# Patient Record
Sex: FEMALE | Race: WHITE | NOT HISPANIC OR LATINO | Employment: OTHER | URBAN - METROPOLITAN AREA
[De-identification: names, ages, dates, MRNs, and addresses within clinical notes are randomized per-mention and may not be internally consistent; named-entity substitution may affect disease eponyms.]

---

## 2017-01-09 ENCOUNTER — ALLSCRIPTS OFFICE VISIT (OUTPATIENT)
Dept: OTHER | Facility: OTHER | Age: 67
End: 2017-01-09

## 2017-01-09 ENCOUNTER — GENERIC CONVERSION - ENCOUNTER (OUTPATIENT)
Dept: OTHER | Facility: OTHER | Age: 67
End: 2017-01-09

## 2017-01-09 DIAGNOSIS — R10.12 LEFT UPPER QUADRANT PAIN: ICD-10-CM

## 2017-03-29 ENCOUNTER — ALLSCRIPTS OFFICE VISIT (OUTPATIENT)
Dept: OTHER | Facility: OTHER | Age: 67
End: 2017-03-29

## 2017-04-24 ENCOUNTER — GENERIC CONVERSION - ENCOUNTER (OUTPATIENT)
Dept: OTHER | Facility: OTHER | Age: 67
End: 2017-04-24

## 2017-04-26 DIAGNOSIS — Z00.00 ENCOUNTER FOR GENERAL ADULT MEDICAL EXAMINATION WITHOUT ABNORMAL FINDINGS: ICD-10-CM

## 2017-04-27 ENCOUNTER — GENERIC CONVERSION - ENCOUNTER (OUTPATIENT)
Dept: OTHER | Facility: OTHER | Age: 67
End: 2017-04-27

## 2017-05-15 ENCOUNTER — ALLSCRIPTS OFFICE VISIT (OUTPATIENT)
Dept: OTHER | Facility: OTHER | Age: 67
End: 2017-05-15

## 2017-08-22 ENCOUNTER — ALLSCRIPTS OFFICE VISIT (OUTPATIENT)
Dept: OTHER | Facility: OTHER | Age: 67
End: 2017-08-22

## 2017-08-22 DIAGNOSIS — I10 ESSENTIAL (PRIMARY) HYPERTENSION: ICD-10-CM

## 2017-08-22 DIAGNOSIS — K21.9 GASTRO-ESOPHAGEAL REFLUX DISEASE WITHOUT ESOPHAGITIS: ICD-10-CM

## 2017-08-22 DIAGNOSIS — Z00.00 ENCOUNTER FOR GENERAL ADULT MEDICAL EXAMINATION WITHOUT ABNORMAL FINDINGS: ICD-10-CM

## 2017-08-22 DIAGNOSIS — E78.5 HYPERLIPIDEMIA: ICD-10-CM

## 2017-08-22 DIAGNOSIS — E66.9 OBESITY: ICD-10-CM

## 2017-08-23 ENCOUNTER — GENERIC CONVERSION - ENCOUNTER (OUTPATIENT)
Dept: OTHER | Facility: OTHER | Age: 67
End: 2017-08-23

## 2017-09-15 ENCOUNTER — GENERIC CONVERSION - ENCOUNTER (OUTPATIENT)
Dept: OTHER | Facility: OTHER | Age: 67
End: 2017-09-15

## 2017-09-27 ENCOUNTER — TRANSCRIBE ORDERS (OUTPATIENT)
Dept: SLEEP CENTER | Facility: CLINIC | Age: 67
End: 2017-09-27

## 2017-09-27 DIAGNOSIS — G47.33 OSA (OBSTRUCTIVE SLEEP APNEA): Primary | ICD-10-CM

## 2017-10-27 ENCOUNTER — GENERIC CONVERSION - ENCOUNTER (OUTPATIENT)
Dept: OTHER | Facility: OTHER | Age: 67
End: 2017-10-27

## 2017-11-29 ENCOUNTER — GENERIC CONVERSION - ENCOUNTER (OUTPATIENT)
Dept: OTHER | Facility: OTHER | Age: 67
End: 2017-11-29

## 2017-12-22 ENCOUNTER — ALLSCRIPTS OFFICE VISIT (OUTPATIENT)
Dept: OTHER | Facility: OTHER | Age: 67
End: 2017-12-22

## 2017-12-22 ENCOUNTER — APPOINTMENT (OUTPATIENT)
Dept: LAB | Facility: CLINIC | Age: 67
End: 2017-12-22
Payer: COMMERCIAL

## 2017-12-22 DIAGNOSIS — F32.9 MAJOR DEPRESSIVE DISORDER, SINGLE EPISODE: ICD-10-CM

## 2017-12-22 DIAGNOSIS — I10 ESSENTIAL (PRIMARY) HYPERTENSION: ICD-10-CM

## 2017-12-22 DIAGNOSIS — E78.5 HYPERLIPIDEMIA: ICD-10-CM

## 2017-12-22 DIAGNOSIS — E66.01 MORBID (SEVERE) OBESITY DUE TO EXCESS CALORIES (HCC): ICD-10-CM

## 2017-12-22 DIAGNOSIS — C73 MALIGNANT NEOPLASM OF THYROID GLAND (HCC): ICD-10-CM

## 2017-12-22 DIAGNOSIS — J45.909 UNCOMPLICATED ASTHMA: ICD-10-CM

## 2017-12-22 LAB
ALBUMIN SERPL BCP-MCNC: 3.6 G/DL (ref 3.5–5)
ALP SERPL-CCNC: 94 U/L (ref 46–116)
ALT SERPL W P-5'-P-CCNC: 20 U/L (ref 12–78)
ANION GAP SERPL CALCULATED.3IONS-SCNC: 3 MMOL/L (ref 4–13)
AST SERPL W P-5'-P-CCNC: 12 U/L (ref 5–45)
BILIRUB SERPL-MCNC: 1.15 MG/DL (ref 0.2–1)
BUN SERPL-MCNC: 13 MG/DL (ref 5–25)
CALCIUM SERPL-MCNC: 9.3 MG/DL (ref 8.3–10.1)
CHLORIDE SERPL-SCNC: 103 MMOL/L (ref 100–108)
CHOLEST SERPL-MCNC: 180 MG/DL (ref 50–200)
CO2 SERPL-SCNC: 31 MMOL/L (ref 21–32)
CREAT SERPL-MCNC: 0.74 MG/DL (ref 0.6–1.3)
ERYTHROCYTE [DISTWIDTH] IN BLOOD BY AUTOMATED COUNT: 13.5 % (ref 11.6–15.1)
GFR SERPL CREATININE-BSD FRML MDRD: 84 ML/MIN/1.73SQ M
GLUCOSE P FAST SERPL-MCNC: 123 MG/DL (ref 65–99)
HCT VFR BLD AUTO: 42.4 % (ref 34.8–46.1)
HDLC SERPL-MCNC: 61 MG/DL (ref 40–60)
HGB BLD-MCNC: 13.9 G/DL (ref 11.5–15.4)
LDLC SERPL CALC-MCNC: 90 MG/DL (ref 0–100)
MCH RBC QN AUTO: 27.4 PG (ref 26.8–34.3)
MCHC RBC AUTO-ENTMCNC: 32.8 G/DL (ref 31.4–37.4)
MCV RBC AUTO: 84 FL (ref 82–98)
PLATELET # BLD AUTO: 329 THOUSANDS/UL (ref 149–390)
PMV BLD AUTO: 10.5 FL (ref 8.9–12.7)
POTASSIUM SERPL-SCNC: 4.3 MMOL/L (ref 3.5–5.3)
PROT SERPL-MCNC: 7.2 G/DL (ref 6.4–8.2)
RBC # BLD AUTO: 5.07 MILLION/UL (ref 3.81–5.12)
SODIUM SERPL-SCNC: 137 MMOL/L (ref 136–145)
T4 FREE SERPL-MCNC: 1.59 NG/DL (ref 0.76–1.46)
TRIGL SERPL-MCNC: 146 MG/DL
TSH SERPL DL<=0.05 MIU/L-ACNC: 0.17 UIU/ML (ref 0.36–3.74)
WBC # BLD AUTO: 6.74 THOUSAND/UL (ref 4.31–10.16)

## 2017-12-22 PROCEDURE — 84439 ASSAY OF FREE THYROXINE: CPT

## 2017-12-22 PROCEDURE — 85027 COMPLETE CBC AUTOMATED: CPT

## 2017-12-22 PROCEDURE — 36415 COLL VENOUS BLD VENIPUNCTURE: CPT

## 2017-12-22 PROCEDURE — 80061 LIPID PANEL: CPT

## 2017-12-22 PROCEDURE — 80053 COMPREHEN METABOLIC PANEL: CPT

## 2017-12-22 PROCEDURE — 84443 ASSAY THYROID STIM HORMONE: CPT

## 2017-12-23 NOTE — PROGRESS NOTES
Assessment   1  Tobacco non-user (V49 89) (Z78 9)   2  History of No advance directive on file (V49 89) (Z78 9)   3  Asthma (493 90) (J45 909)   4  Depression (311) (F32 9)   5  Hypertension (401 9) (I10)   6  Hyperlipidemia (272 4) (E78 5)   7  Obesity, morbid (278 01) (E66 01)   8  Thyroid cancer (193) (C73)    Plan   Asthma, Depression, Hyperlipidemia, Hypertension, Obesity, morbid, Thyroid cancer    · (1) CBC/ PLT (NO DIFF); Status:Active; Requested for:18Npu5520;    · (1) COMPREHENSIVE METABOLIC PANEL; Status:Active; Requested for:94Jpn4023;    · (1) LIPID PANEL, FASTING; Status:Active; Requested for:12Aey9394;    · (1) T4, FREE; Status:Active; Requested for:26Fou5303;    · (1) TSH; Status:Active; Requested for:78Wfb6027;    · Follow-up visit in 4 Months Evaluation and Treatment  Follow-up  Status: Hold For - Scheduling     Requested for: 30Bkt6560    Discussion/Summary   Discussion Summary:    Regarding her weight she is going to continue to try and lose weight  will get a flu shot today  will check all of her labs today which she was post to do  asthma is under control  blood pressure is stable at the present time  will see her back in 6 months  She is otherwise up-to-date on health maintenance issues  Chief Complaint   Chief Complaint Free Text Note Form: Problems are 1  Asthma 2  Hypothyroid 3  Morbid obesity 4  Hypertension 5  Hyperlipidemia 6  Obstructive sleep apnea  History of Present Illness   HPI: She comes in for follow-up  She has gained 2 lb  She is feels fairly well but does have a lot of stress taking care of her   not up-to-date on eye checkups  Otherwise up-to-date on health maintenance issues  Review of Systems   Complete-Female:      Constitutional: No fever, no chills, feels well, no tiredness, no recent weight gain or weight loss  Eyes: No complaints of eye pain, no red eyes, no eyesight problems, no discharge, no dry eyes, no itching of eyes  ENT: no complaints of earache, no loss of hearing, no nose bleeds, no nasal discharge, no sore throat, no hoarseness  Cardiovascular: No complaints of slow heart rate, no fast heart rate, no chest pain, no palpitations, no leg claudication, no lower extremity edema  Respiratory: No complaints of shortness of breath, no wheezing, no cough, no SOB on exertion, no orthopnea, no PND  Gastrointestinal: Is morbidly obese , but-- No complaints of abdominal pain, no constipation, no nausea or vomiting, no diarrhea, no bloody stools  Genitourinary: No complaints of dysuria, no incontinence, no pelvic pain, no dysmenorrhea, no vaginal discharge or bleeding  Musculoskeletal: No complaints of arthralgias, no myalgias, no joint swelling or stiffness, no limb pain or swelling  Integumentary: No complaints of skin rash or lesions, no itching, no skin wounds, no breast pain or lump  Neurological: No complaints of headache, no confusion, no convulsions, no numbness, no dizziness or fainting, no tingling, no limb weakness, no difficulty walking  Psychiatric: Not suicidal, no sleep disturbance, no anxiety or depression, no change in personality, no emotional problems  Endocrine: No complaints of proptosis, no hot flashes, no muscle weakness, no deepening of the voice, no feelings of weakness  Hematologic/Lymphatic: No complaints of swollen glands, no swollen glands in the neck, does not bleed easily, does not bruise easily  Active Problems   1  Abdominal pain, acute, left upper quadrant (789 02,338 19) (R10 12)   2  Acute bacterial conjunctivitis of both eyes (372 03) (H10 33)   3  Allergic rhinitis (477 9) (J30 9)   4  Asthma (493 90) (J45 909)   5  Change in stool habits (787 99) (R19 4)   6  Chest pain (786 50) (R07 9)   7  Dental infection (522 4) (K04 7)   8  Depression (311) (F32 9)   9  GERD without esophagitis (530 81) (K21 9)   10   History of colon polyps (V12 72) (Z86 010)   11  Hyperlipidemia (272 4) (E78 5)   12  Hypertension (401 9) (I10)   13  History of No advance directive on file (V49 89) (Z78 9)   14  Obesity (278 00) (E66 9)   15  Obesity, morbid (278 01) (E66 01)   16  HEIDI on CPAP (327 23,V46 8) (G47 33,Z99 89)   17  Postnasal drip (784 91) (R09 82)   18  Postoperative hypothyroidism (244 0) (E89 0)   19  Rib pain on left side (786 50) (R07 81)   20  Screening for genitourinary condition (V81 6) (Z13 89)   21  Screening for neurological condition (V80 09) (Z13 89)   22  Thyroid cancer (193) (C73)    Past Medical History   1  History of Carpal tunnel syndrome, unspecified laterality (354 0) (G56 00)   2  History of Closed Bone Fracture (829 0)   3  History of Deviated septum (470) (J34 2)   4  History of Disc degeneration, lumbar (722 52) (M51 36)   5  History of Encounter for screening mammogram for malignant neoplasm of breast (V76 12) (Z12 31)   6  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)   7  History of hepatitis (V12 09) (Z86 19)   8  History of hypertension (V12 59) (Z86 79)   9  History of hypothyroidism (V12 29) (Z86 39)   10  History of Malignant Thyroid Neoplasm (V10 87)   11  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)   12  History of Obstructive sleep apnea (327 23) (G47 33)  Active Problems And Past Medical History Reviewed: The active problems and past medical history were reviewed and updated today  Surgical History   1  History of Excision Of Baker's Cyst   2  History of Thyroid Surgery   3  History of Tonsillectomy  Surgical History Reviewed: The surgical history was reviewed and updated today  Family History   Mother    1  Family history of    2  Family history of Myocardial Infarction Arrhythmias  Father    3  Family history of Alcoholism, chronic   4  Family history of   Family History    5  Family history of hepatic cirrhosis (V18 59) (Z83 79)  Family History Reviewed:     The family history was reviewed and updated today  Social History    · Denied: History of Alcohol Use (History)   · Denied: History of Drug Use   · Lives with spouse   · Never A Smoker   · History of No advance directive on file (V49 89) (Z78 9)   · Occupation: Retired   · Tobacco non-user (V49 89) (Z78 9)  Social History Reviewed: The social history was reviewed and updated today  The social history was reviewed and is unchanged  Current Meds    1  Dulera 200-5 MCG/ACT Inhalation Aerosol; INHALE 2 PUFFS BY MOUTH TWO TIMES DAILY; Therapy: 22Apr2015 to (Evaluate:64Pci7059)  Requested for: 14Gyi1044; Last Rx:11Yay2470     Ordered   2  Escitalopram Oxalate 20 MG Oral Tablet; take 1 tablet by mouth every day; Therapy: 07TBO0762 to (David Rudd)  Requested for: 83BNA7091; Last Rx:02Jun2017     Ordered   3  Fluticasone Propionate 50 MCG/ACT Nasal Suspension; USE 1 SPRAY IN EACH NOSTRIL TWICE     DAILY; Therapy: 56IKH5376 to (Evaluate:18Nov2016)  Requested for: 29Jwy4321; Last Rx:59Gox4185     Ordered   4  Levothyroxine Sodium 150 MCG Oral Tablet; TAKE 1 TABLET DAILY  Requested for: 97Fok3127;     Last Rx:56Usv1972 Ordered   5  Metoprolol Succinate ER 50 MG Oral Tablet Extended Release 24 Hour; TAKE 1 TABLET DAILY; Therapy: 61Npi7385 to (Evaluate:89Tcw4199)  Requested for: 04Opv4141; Last Rx:88Xum1182     Ordered   6  Multivitamins Oral Capsule; Therapy: 40OXV7578 to Recorded   7  Pantoprazole Sodium 40 MG Oral Tablet Delayed Release; take 1 tablet every day; Therapy: 43AIC6455 to (Evaluate:16Jun2018)  Requested for: 17XPU5828; Last Rx:37Tdi3512     Ordered   8  ProAir  (90 Base) MCG/ACT Inhalation Aerosol Solution; INHALE 2 PUFFS BY MOUTH     EVERY 6 HOURS AS NEEED; Therapy: 79HZY2671 to (Evaluate:14Ysv0704)  Requested for: 13Ind3532; Last Rx:02Jun2015     Ordered   9  Simvastatin 20 MG Oral Tablet; take 1 tablet every day;      Therapy: 58YCQ3035 to (Evaluate:02Zsq1944)  Requested for: 03TYU5912; Last Rx: 62ODQ6321     Ordered   10  Tobramycin 0 3 % Ophthalmic Solution; INSTILL 1 DROP INTO AFFECTED EYE(S) 4 TIMES DAILY; Therapy: 44UXS4473 to (Evaluate:18May2017)  Requested for: 26BMT8076; Last Rx:08Xvo0683      Ordered   11  Valsartan 320 MG Oral Tablet; take 1 tablet every day; Therapy: 82ELN5983 to (Tigre Bolus)  Requested for: 49ZNH4238; Last Rx:02Jun2017      Ordered   12  Ventolin  (90 Base) MCG/ACT Inhalation Aerosol Solution; INHALE 2 PUFFS EVERY 6 HOURS      AS NEEDED; Therapy: 79Lyx2359 to (Evaluate:11Jan2018)  Requested for: 22Nov2017; Last Rx:22Nov2017      Ordered  Medication List Reviewed: The medication list was reviewed and updated today  Allergies   1  Diuretic TABS    Vitals   Vital Signs    Recorded: 28Jqu4490 10:59AM   Heart Rate 69   Systolic 021   Diastolic 82   Height 5 ft 7 in   Weight 285 lb    BMI Calculated 44 64   BSA Calculated 2 35   O2 Saturation 97     Physical Exam        Constitutional      General appearance: Abnormal  -- Morbidly obese female in no acute distress  Blood pressure is 120/80  Heart rhythm is regular  O2 saturation is 97  Pulse is 70  Eyes      Conjunctiva and lids: No swelling, erythema or discharge  Pupils and irises: Equal, round and reactive to light  Ears, Nose, Mouth, and Throat      External inspection of ears and nose: Normal        Otoscopic examination: Tympanic membranes translucent with normal light reflex  Canals patent without erythema  Nasal mucosa, septum, and turbinates: Normal without edema or erythema  Oropharynx: Normal with no erythema, edema, exudate or lesions  Pulmonary      Respiratory effort: No increased work of breathing or signs of respiratory distress  Auscultation of lungs: Abnormal  -- Breath sounds somewhat diminished  Cardiovascular      Palpation of heart: Normal PMI, no thrills  Auscultation of heart: Abnormal  -- Heart tones distant  Examination of extremities for edema and/or varicosities: Normal        Carotid pulses: Normal        Abdomen      Abdomen: Abnormal  -- Abdomen is morbidly obese  Liver and spleen: No hepatomegaly or splenomegaly  Lymphatic      Palpation of lymph nodes in neck: No lymphadenopathy  Musculoskeletal      Gait and station: Normal        Digits and nails: Normal without clubbing or cyanosis  Inspection/palpation of joints, bones, and muscles: Normal        Skin      Skin and subcutaneous tissue: Normal without rashes or lesions  Neurologic      Cranial nerves: Cranial nerves 2-12 intact  Reflexes: 2+ and symmetric  Sensation: No sensory loss  Psychiatric      Orientation to person, place, and time: Normal        Mood and affect: Normal           Results/Data   Falls Risk Assessment (Dx Z13 89 Screen for Neurologic Disorder) 39YCU7582 11:09AM User, s      Test Name Result Flag Reference   Falls Risk      No falls in the past year        Health Management   History of colon polyps   COLONOSCOPY; every 5 years; Last 45Obk7155; Next Due: 06Amk7544; Active  Health Maintenance   Pneumo; every 5 years; Last 78SRE1194; Next Due: S9914385; Overdue    Future Appointments      Date/Time Provider Specialty Site   03/15/2018 10:00 AM ISHAN Michael   Pulmonary Medicine [facility]   03/23/2018 11:00 AM Nader Benito DO Internal Medicine [facility]     Signatures    Electronically signed by : Ayleen Sheppard DO; Dec 22 2017  1:14PM EST                       (Author)

## 2017-12-27 ENCOUNTER — GENERIC CONVERSION - ENCOUNTER (OUTPATIENT)
Dept: OTHER | Facility: OTHER | Age: 67
End: 2017-12-27

## 2018-01-10 NOTE — RESULT NOTES
Verified Results  (1) CBC/PLT/DIFF 78TJU8771 01:46PM Ismael Greer    Order Number: [id number]     Test Name Result Flag Reference   WBC COUNT 5 46 Thousand/uL  4 31-10 16   RBC COUNT 4 83 Million/uL  3 81-5 12   HEMOGLOBIN 13 5 g/dL  11 5-15 4   HEMATOCRIT 40 9 %  34 8-46  1   MCV 85 fL  82-98   MCH 28 0 pg  26 8-34 3   MCHC 33 0 g/dL  31 4-37 4   RDW 13 4 %  11 6-15 1   MPV 10 1 fL  8 9-12 7   PLATELET COUNT 261 Thousands/uL  149-390   nRBC AUTOMATED 0 /100 WBCs     NEUTROPHILS RELATIVE PERCENT 44 %  43-75   LYMPHOCYTES RELATIVE PERCENT 31 %  14-44   MONOCYTES RELATIVE PERCENT 17 % H 4-12   EOSINOPHILS RELATIVE PERCENT 7 % H 0-6   BASOPHILS RELATIVE PERCENT 1 %  0-1   NEUTROPHILS ABSOLUTE COUNT 2 42 Thousands/?L  1 85-7 62   LYMPHOCYTES ABSOLUTE COUNT 1 68 Thousands/?L  0 60-4 47   MONOCYTES ABSOLUTE COUNT 0 95 Thousand/?L  0 17-1 22   EOSINOPHILS ABSOLUTE COUNT 0 37 Thousand/?L  0 00-0 61   BASOPHILS ABSOLUTE COUNT 0 03 Thousands/?L  0 00-0 10   - Patient Instructions: This bloodwork is non-fasting  Please drink two glasses of water morning of bloodwork  - Patient Instructions: This bloodwork is non-fasting  Please drink two glasses of water morning of bloodwork  (1) COMPREHENSIVE METABOLIC PANEL 91CMK2116 64:15UB Ismael Greer    Order Number: [id number]     Test Name Result Flag Reference   GLUCOSE,RANDM 108 mg/dL     If the patient is fasting, the ADA then defines impaired fasting glucose as > 100 mg/dL and diabetes as > or equal to 123 mg/dL     SODIUM 140 mmol/L  136-145   POTASSIUM 3 8 mmol/L  3 5-5 3   CHLORIDE 103 mmol/L  100-108   CARBON DIOXIDE 31 mmol/L  21-32   ANION GAP (CALC) 6 mmol/L  4-13   BLOOD UREA NITROGEN 12 mg/dL  5-25   CREATININE 0 69 mg/dL  0 60-1 30   Standardized to IDMS reference method   CALCIUM 9 0 mg/dL  8 3-10 1   BILI, TOTAL 0 56 mg/dL  0 20-1 00   ALK PHOSPHATAS 93 U/L     ALT (SGPT) 27 U/L  12-78   AST(SGOT) 15 U/L  5-45 ALBUMIN 3 5 g/dL  3 5-5 0   TOTAL PROTEIN 6 8 g/dL  6 4-8 2   eGFR Non-African American      >60 0 ml/min/1 73sq [facility] Disease Education Program recommendations are as follows:  GFR calculation is accurate only with a steady state creatinine  Chronic Kidney disease less than 60 ml/min/1 73 sq  meters  Kidney failure less than 15 ml/min/1 73 sq  meters       (1) AMYLASE 88NGW9959 01:46PM Kasia RIVAS Order Number: [id number]     Test Name Result Flag Reference   AMYLASE 14 IU/L L      (1) LIPASE 16YIK3888 01:46PM Valente Eubanks Order Number: [id number]     Test Name Result Flag Reference   LIPASE 82 u/L

## 2018-01-12 VITALS
DIASTOLIC BLOOD PRESSURE: 90 MMHG | SYSTOLIC BLOOD PRESSURE: 140 MMHG | HEART RATE: 81 BPM | HEIGHT: 67 IN | WEIGHT: 288.25 LBS | BODY MASS INDEX: 45.24 KG/M2

## 2018-01-13 VITALS
BODY MASS INDEX: 44.42 KG/M2 | WEIGHT: 283 LBS | OXYGEN SATURATION: 95 % | SYSTOLIC BLOOD PRESSURE: 136 MMHG | HEART RATE: 76 BPM | DIASTOLIC BLOOD PRESSURE: 80 MMHG | HEIGHT: 67 IN

## 2018-01-13 VITALS
BODY MASS INDEX: 44.6 KG/M2 | HEIGHT: 67 IN | HEART RATE: 77 BPM | OXYGEN SATURATION: 96 % | SYSTOLIC BLOOD PRESSURE: 136 MMHG | DIASTOLIC BLOOD PRESSURE: 86 MMHG | WEIGHT: 284.13 LBS | TEMPERATURE: 97.7 F

## 2018-01-14 VITALS
DIASTOLIC BLOOD PRESSURE: 84 MMHG | BODY MASS INDEX: 44.73 KG/M2 | SYSTOLIC BLOOD PRESSURE: 136 MMHG | HEIGHT: 67 IN | WEIGHT: 285 LBS

## 2018-01-14 NOTE — PROGRESS NOTES
Assessment    1  Tobacco non-user (V49 89) (Z78 9)   2  Hyperlipidemia (272 4) (E78 5)   3  Hypertension (401 9) (I10)   4  Obesity (278 00) (E66 9)   5  Encounter for preventive health examination (V70 0) (Z00 00)   6  GERD without esophagitis (530 81) (K21 9)    Plan  Health Maintenance    · Levothyroxine Sodium 150 MCG Oral Tablet; TAKE 1 TABLET DAILY  Health Maintenance, GERD without esophagitis, Hyperlipidemia, Hypertension, Obesity    · (1) CBC/PLT/DIFF; Status:Active; Requested for:78Rul6725;    · (1) COMPREHENSIVE METABOLIC PANEL; Status:Active; Requested for:60Sfb9406;    · (1) LIPID PANEL, FASTING; Status:Active; Requested for:34Hja5976;    · (1) T4, FREE; Status:Active; Requested for:92Cjd9076;    · (1) TSH; Status:Active; Requested for:78Frf4026;    · (1) URINALYSIS (will reflex a microscopy if leukocytes, occult blood, protein or nitrites are not within  normal limits); Status:Active; Requested for:05Vmt8067;    · Follow-up visit in 4 Months Evaluation and Treatment  Follow-up  Status: Hold For - Scheduling   Requested for: 33Rgd4833    Discussion/Summary  Discussion Summary:   Regarding her GI issues she is going to see her gastroenterologist     She is going to check all of her labs  I reviewed her medicines  I will see her back in 4 months  She will call before if any problems  Chief Complaint  Chief Complaint Free Text Note Form: Problems are #1 abdominal pain #2 hypertension #3 obesity #4 depression #5 hyperlipidemia #6 hypothyroid      History of Present Illness  HPI: She comes in for follow-up  She did have some abdominal pain  Review of Systems  Complete-Female:   Constitutional: She generally feels fairly well  She still having some GI distress and needs to see her gastroenterologist, but as noted in HPI  Eyes: No complaints of eye pain, no red eyes, no eyesight problems, no discharge, no dry eyes, no itching of eyes     ENT: no complaints of earache, no loss of hearing, no nose bleeds, no nasal discharge, no sore throat, no hoarseness  Cardiovascular: She has stable hypertension, but No complaints of slow heart rate, no fast heart rate, no chest pain, no palpitations, no leg claudication, no lower extremity edema  Respiratory: No complaints of shortness of breath, no wheezing, no cough, no SOB on exertion, no orthopnea, no PND  Gastrointestinal: She is morbidly obese, but as noted in HPI  Genitourinary: No complaints of dysuria, no incontinence, no pelvic pain, no dysmenorrhea, no vaginal discharge or bleeding  Endocrine: No complaints of proptosis, no hot flashes, no muscle weakness, no deepening of the voice, no feelings of weakness  Hematologic/Lymphatic: No complaints of swollen glands, no swollen glands in the neck, does not bleed easily, does not bruise easily  Active Problems    1  Abdominal pain, acute, left upper quadrant (789 02,338 19) (R10 12)   2  Acute bacterial conjunctivitis of both eyes (372 03) (H10 33)   3  Allergic rhinitis (477 9) (J30 9)   4  Asthma (493 90) (J45 909)   5  Change in stool habits (787 99) (R19 4)   6  Chest pain (786 50) (R07 9)   7  Dental infection (522 4) (K04 7)   8  Depression (311) (F32 9)   9  GERD without esophagitis (530 81) (K21 9)   10  History of colon polyps (V12 72) (Z86 010)   11  Hyperlipidemia (272 4) (E78 5)   12  Hypertension (401 9) (I10)   13  No advance directive on file (V49 89) (Z78 9)   14  Obesity (278 00) (E66 9)   15  HEIDI on CPAP (327 23,V46 8) (G47 33,Z99 89)   16  Postnasal drip (784 91) (R09 82)   17  Postoperative hypothyroidism (244 0) (E89 0)   18  Rib pain on left side (786 50) (R07 81)   19  Screening for genitourinary condition (V81 6) (Z13 89)   20  Screening for neurological condition (V80 09) (Z13 89)   21  Thyroid cancer (193) (C73)    Past Medical History    1  History of Carpal tunnel syndrome, unspecified laterality (354 0) (G56 00)   2  History of Closed Bone Fracture (829 0)   3   History of Deviated septum (470) (J34 2)   4  History of Disc degeneration, lumbar (722 52) (M51 36)   5  History of Encounter for screening mammogram for malignant neoplasm of breast (V76 12) (Z12 31)   6  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)   7  History of hepatitis (V12 09) (Z86 19)   8  History of hypertension (V12 59) (Z86 79)   9  History of hypothyroidism (V12 29) (Z86 39)   10  History of Malignant Thyroid Neoplasm (V10 87)   11  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)   12  History of Obstructive sleep apnea (327 23) (G47 33)  Active Problems And Past Medical History Reviewed: The active problems and past medical history were reviewed and updated today  Surgical History    1  History of Excision Of Baker's Cyst   2  History of Thyroid Surgery   3  History of Tonsillectomy  Surgical History Reviewed: The surgical history was reviewed and updated today  Family History  Mother    1  Family history of    2  Family history of Myocardial Infarction Arrhythmias  Father    3  Family history of Alcoholism, chronic   4  Family history of   Family History    5  Family history of hepatic cirrhosis (V18 59) (Z83 79)  Family History Reviewed: The family history was reviewed and updated today  Social History    · Denied: History of Alcohol Use (History)   · Denied: History of Drug Use   · Lives with spouse   · Never A Smoker   · No advance directive on file (M31 20) (Z78 9)   · Occupation: Retired   · Tobacco non-user (V49 89) (Z78 9)  Social History Reviewed: The social history was reviewed and updated today  The social history was reviewed and is unchanged  Current Meds   1  Dulera 200-5 MCG/ACT Inhalation Aerosol; INHALE 2 PUFFS Twice daily; Therapy: 2015 to (Evaluate:2017)  Requested for: 08Hqw7222; Last Rx:01Tfk8685   Ordered   2  Escitalopram Oxalate 20 MG Oral Tablet; take 1 tablet by mouth every day;    Therapy: 96PHQ5218 to (Real Damaris) Requested for: 75YJS7292; Last Rx:02Jun2017   Ordered   3  Fluticasone Propionate 50 MCG/ACT Nasal Suspension; USE 1 SPRAY IN EACH NOSTRIL TWICE   DAILY; Therapy: 70MQJ7847 to (Evaluate:18Nov2016)  Requested for: 97Xse1531; Last Rx:55Uxv5133   Ordered   4  Levothyroxine Sodium 150 MCG Oral Tablet; TAKE 1 TABLET DAILY  Requested for: 07BMU6746;   Last Rx:09Jan2017 Ordered   5  Metoprolol Succinate ER 50 MG Oral Tablet Extended Release 24 Hour; TAKE 1 TABLET DAILY; Therapy: 43Fdx8824 to (Evaluate:96Gib9036)  Requested for: 86Joa4612; Last Rx:47Msx9616   Ordered   6  Multivitamins Oral Capsule; Therapy: 79ROY3612 to Recorded   7  Pantoprazole Sodium 40 MG Oral Tablet Delayed Release; TAKE 1 TABLET DAILY; Therapy: 57EWL7198 to (Evaluate:07Nov2017)  Requested for: 09BRJ4183; Last Rx:91Qod1535   Ordered   8  ProAir  (90 Base) MCG/ACT Inhalation Aerosol Solution; INHALE 2 PUFFS BY MOUTH   EVERY 6 HOURS AS NEEED; Therapy: 05BZT5056 to (Evaluate:65Kcm7653)  Requested for: 97PEZ8863; Last II:44JRB4636   Ordered   9  Simvastatin 20 MG Oral Tablet; take 1 tablet every day; Therapy: 97WFU8219 to (Evaluate:20Apr2018)  Requested for: 67MVH7661; Last Rx:70Wam1202   Ordered   10  Tobramycin 0 3 % Ophthalmic Solution; INSTILL 1 DROP INTO AFFECTED EYE(S) 4 TIMES DAILY; Therapy: 52ITG8262 to (Evaluate:63Dge5015)  Requested for: 51PSV3618; Last Rx:31Vra0647    Ordered   11  Valsartan 320 MG Oral Tablet; take 1 tablet every day; Therapy: 99AGD6299 to ([facility]ic[facility])  Requested for: 61UFK2027; Last Rx:02Jun2017    Ordered   12  Ventolin  (90 Base) MCG/ACT Inhalation Aerosol Solution; INHALE 2 PUFFS Every 6 hours    PRN; Therapy: 77Vvz0018 to (Last Rx:20Qxf9604)  Requested for: 71Cls3887 Ordered  Medication List Reviewed: The medication list was reviewed and updated today  Allergies    1   Diuretic TABS    Vitals  Vital Signs    Recorded: 17Qyg3190 01:44PM   Heart Rate 76   Systolic 698 Diastolic 80   Height 5 ft 7 in   Weight 283 lb    BMI Calculated 44 32   BSA Calculated 2 34   O2 Saturation 95     Physical Exam    Constitutional   General appearance: Abnormal   She is obese  Her blood pressure is 130/80  Eyes   Conjunctiva and lids: No swelling, erythema or discharge  Pupils and irises: Equal, round and reactive to light  Ears, Nose, Mouth, and Throat   External inspection of ears and nose: Normal     Otoscopic examination: Tympanic membranes translucent with normal light reflex  Canals patent without erythema  Nasal mucosa, septum, and turbinates: Normal without edema or erythema  Oropharynx: Normal with no erythema, edema, exudate or lesions  Pulmonary   Respiratory effort: No increased work of breathing or signs of respiratory distress  Auscultation of lungs: Clear to auscultation  Lungs are completely clear with no rales  Cardiovascular   Auscultation of heart: Abnormal   Heart tones are distant  Examination of extremities for edema and/or varicosities: Abnormal   She has a few venous varicosities  Abdomen   Abdomen: Abnormal   Abdomen morbidly obese  Liver and spleen: No hepatomegaly or splenomegaly  Lymphatic   Palpation of lymph nodes in neck: No lymphadenopathy  Musculoskeletal   Inspection/palpation of joints, bones, and muscles: Abnormal   She has marked swelling and decreased range of motion of the left main  Skin   Skin and subcutaneous tissue: Normal without rashes or lesions  Neurologic   Cranial nerves: Cranial nerves 2-12 intact  Psychiatric   Orientation to person, place, and time: Normal     Mood and affect: Normal          Results/Data  PHQ-9 Adult Depression Screening 68Tmo1528 01:48PM [employer]     Test Name Result Flag Reference   PHQ-9 Adult Depression Score 1     Over the last two weeks, how often have you been bothered by any of the following problems?   Little interest or pleasure in doing things: Not at all - 0  Feeling down, depressed, or hopeless: Not at all - 0  Trouble falling or staying asleep, or sleeping too much: Several days - 1  Feeling tired or having little energy: Not at all - 0  Poor appetite or over eating: Not at all - 0  Feeling bad about yourself - or that you are a failure or have let yourself or your family down: Not at all - 0  Trouble concentrating on things, such as reading the newspaper or watching television: Not at all - 0  Moving or speaking so slowly that other people could have noticed  Or the opposite -  being so fidgety or restless that you have been moving around a lot more than usual: Not at all - 0  Thoughts that you would be better off dead, or of hurting yourself in some way: Not at all - 0   PHQ-9 Adult Depression Screening Negative     PHQ-9 Difficulty Level Not difficult at all     PHQ-9 Severity Minimal Depression       *VB - Urinary Incontinence Screen (Dx Z13 89 Screen for UI) 75Qpm1546 01:47PM Adrian Escamilla     Test Name Result Flag Reference   Urinary Incontinence Assessment sometimes 08/22/2017         Health Management  History of colon polyps   COLONOSCOPY; every 5 years; Last 05Gkw7202; Next Due: 80Cyu8133; Active  Health Maintenance   Pneumo; every 5 years; Last 78GOG0994; Next Due: W0101638;  Overdue    Future Appointments    Date/Time Provider Specialty Site   12/22/2017 11:00 AM Adrian Escamilla DO Internal Medicine [facility]  [address]     Signatures   Electronically signed by : Nicole Hampton DO; Aug 23 2017  8:07AM EST                       (Author)

## 2018-01-17 NOTE — RESULT NOTES
Verified Results  * XR RIBS LEFT W PA CHEST MIN 3 VIEWS 52WPK6489 02:11PM Estefanía Mathews    Order Number: [id number]     Test Name Result Flag Reference   XR RIBS LEFT W PA CHEST MIN 3 VIEWS (Report)     LEFT RIBS AND CHEST     INDICATION: Left chest pain for a month     COMPARISON: None     VIEWS: Frontal chest and 3 views left hemithorax; 4 images     FINDINGS:     The cardiomediastinal silhouette is unremarkable  Lungs are clear  No pleural effusions  There is no pneumothorax  No rib fractures are identified  Small surgical clips project near the thoracic inlet  These might be related to prior thyroid or vascular surgery  Correlate with history  IMPRESSION:     1  No active pulmonary disease  2  No evidence of rib fractures         Workstation performed: [id number]     Signed by:   Candace Ennis MD   12/1/16

## 2018-01-22 VITALS
BODY MASS INDEX: 44.18 KG/M2 | DIASTOLIC BLOOD PRESSURE: 84 MMHG | HEIGHT: 67 IN | SYSTOLIC BLOOD PRESSURE: 116 MMHG | WEIGHT: 281.5 LBS | OXYGEN SATURATION: 95 % | HEART RATE: 77 BPM

## 2018-01-23 VITALS
BODY MASS INDEX: 44.73 KG/M2 | DIASTOLIC BLOOD PRESSURE: 82 MMHG | WEIGHT: 285 LBS | HEART RATE: 69 BPM | HEIGHT: 67 IN | OXYGEN SATURATION: 97 % | SYSTOLIC BLOOD PRESSURE: 156 MMHG

## 2018-01-23 NOTE — RESULT NOTES
Verified Results  (1) CBC/ PLT (NO DIFF) 72Trr3647 11:52AM [employer] [employer] Order Number: [id number]     Test Name Result Flag Reference   HEMATOCRIT 42 4 %  34 8-46 1   HEMOGLOBIN 13 9 g/dL  11 5-15 4   MCHC 32 8 g/dL  31 4-37 4   MCH 27 4 pg  26 8-34 3   MCV 84 fL  82-98   PLATELET COUNT 371 Thousands/uL  149-390   RBC COUNT 5 07 Million/uL  3 81-5 12   RDW 13 5 %  11 6-15 1   WBC COUNT 6 74 Thousand/uL  4 31-10 16   MPV 10 5 fL  8 9-12 7     (1) COMPREHENSIVE METABOLIC PANEL 22TWX3591 64:31AP [employer] Order Number: [id number]     Test Name Result Flag Reference   SODIUM 137 mmol/L  136-145   POTASSIUM 4 3 mmol/L  3 5-5 3   CHLORIDE 103 mmol/L  100-108   CARBON DIOXIDE 31 mmol/L  21-32   ANION GAP (CALC) 3 mmol/L L 4-13   BLOOD UREA NITROGEN 13 mg/dL  5-25   CREATININE 0 74 mg/dL  0 60-1 30   Standardized to IDMS reference method   CALCIUM 9 3 mg/dL  8 3-10 1   BILI, TOTAL 1 15 mg/dL H 0 20-1 00   ALK PHOSPHATAS 94 U/L     ALT (SGPT) 20 U/L  12-78   Specimen collection should occur prior to Sulfasalazine and/or Sulfapyridine administration due to the potential for falsely depressed results  AST(SGOT) 12 U/L  5-45   Specimen collection should occur prior to Sulfasalazine administration due to the potential for falsely depressed results  ALBUMIN 3 6 g/dL  3 5-5 0   TOTAL PROTEIN 7 2 g/dL  6 4-8 2   eGFR 84 ml/min/1 73sq m     National Kidney Disease Education Program recommendations are as follows:  GFR calculation is accurate only with a steady state creatinine  Chronic Kidney disease less than 60 ml/min/1 73 sq  meters  Kidney failure less than 15 ml/min/1 73 sq  meters  GLUCOSE FASTING 123 mg/dL H 65-99   Specimen collection should occur prior to Sulfasalazine administration due to the potential for falsely depressed results  Specimen collection should occur prior to Sulfapyridine administration due to the potential for falsely elevated results       (1) LIPID PANEL, FASTING 76OOK8893 11:52AM [employer] Order Number: [id number]     Test Name Result Flag Reference   CHOLESTEROL 180 mg/dL     HDL,DIRECT 61 mg/dL H 40-60   Specimen collection should occur prior to Metamizole administration due to the potential for falsley depressed results  LDL CHOLESTEROL CALCULATED 90 mg/dL  0-100   Triglyceride:        Normal <150 mg/dl   Borderline High 150-199 mg/dl   High 200-499 mg/dl   Very High >499 mg/dl      Cholesterol:       Desirable <200 mg/dl    Borderline High 200-239 mg/dl    High >239 mg/dl      HDL Cholesterol:       High>59 mg/dL    Low <41 mg/dL      This screening LDL is a calculated result  It does not have the accuracy of the Direct Measured LDL in the monitoring of patients with hyperlipidemia and/or statin therapy  Direct Measure LDL (LXK572) must be ordered separately in these patients  TRIGLYCERIDES 146 mg/dL  <=150   Specimen collection should occur prior to N-Acetylcysteine or Metamizole administration due to the potential for falsely depressed results  (1) TSH 47Kye3475 11:52AM [employer] Order Number: [id number]     Test Name Result Flag Reference   TSH 0 170 uIU/mL L 0 358-3 740   Patients undergoing fluorescein dye angiography may retain small amounts of fluorescein in the body for 48-72 hours post procedure  Samples containing fluorescein can produce falsely depressed TSH values  If the patient had this procedure,a specimen should be resubmitted post fluorescein clearance            The recommended reference ranges for TSH during pregnancy are as follows:  First trimester 0 1 to 2 5 uIU/mL  Second trimester  0 2 to 3 0 uIU/mL  Third trimester 0 3 to 3 0 uIU/m     (1) T4, FREE 24Ehh0551 11:52AM [employer] Order Number: [id number]     Test Name Result Flag Reference   T4,FREE 1 59 ng/dL H 0 76-1 46   Specimen collection should occur prior to Sulfasalazine administration due to the potential for falsely elevated results

## 2018-01-30 DIAGNOSIS — E78.00 PURE HYPERCHOLESTEROLEMIA: Primary | ICD-10-CM

## 2018-01-30 RX ORDER — SIMVASTATIN 20 MG
TABLET ORAL
Qty: 90 TABLET | Refills: 2 | Status: SHIPPED | OUTPATIENT
Start: 2018-01-30 | End: 2018-07-01 | Stop reason: SDUPTHER

## 2018-01-31 DIAGNOSIS — E78.00 PURE HYPERCHOLESTEROLEMIA: ICD-10-CM

## 2018-01-31 RX ORDER — SIMVASTATIN 20 MG
TABLET ORAL
Qty: 90 TABLET | Refills: 2 | OUTPATIENT
Start: 2018-01-31

## 2018-02-16 DIAGNOSIS — E03.9 HYPOTHYROIDISM, UNSPECIFIED TYPE: Primary | ICD-10-CM

## 2018-02-16 RX ORDER — LEVOTHYROXINE SODIUM 0.15 MG/1
TABLET ORAL
Qty: 90 TABLET | Refills: 1 | Status: SHIPPED | OUTPATIENT
Start: 2018-02-16 | End: 2019-05-15 | Stop reason: SDUPTHER

## 2018-03-22 RX ORDER — METOPROLOL SUCCINATE 50 MG/1
1 TABLET, EXTENDED RELEASE ORAL DAILY
COMMUNITY
Start: 2015-04-29 | End: 2019-02-22 | Stop reason: SDUPTHER

## 2018-03-22 RX ORDER — CHLORAL HYDRATE 500 MG
CAPSULE ORAL
COMMUNITY
End: 2018-09-19 | Stop reason: HOSPADM

## 2018-03-22 RX ORDER — ESCITALOPRAM OXALATE 20 MG/1
1 TABLET ORAL DAILY
COMMUNITY
Start: 2014-07-30 | End: 2018-07-13 | Stop reason: SDUPTHER

## 2018-03-22 RX ORDER — RIBOFLAVIN (VITAMIN B2) 100 MG
TABLET ORAL
COMMUNITY
End: 2018-09-19 | Stop reason: HOSPADM

## 2018-03-22 RX ORDER — TOBRAMYCIN 3 MG/ML
1 SOLUTION/ DROPS OPHTHALMIC 4 TIMES DAILY
COMMUNITY
Start: 2017-05-15 | End: 2018-12-12

## 2018-03-22 RX ORDER — PANTOPRAZOLE SODIUM 40 MG/1
1 TABLET, DELAYED RELEASE ORAL DAILY
COMMUNITY
Start: 2016-11-30 | End: 2018-07-10 | Stop reason: SDUPTHER

## 2018-03-22 RX ORDER — CITALOPRAM 40 MG/1
TABLET ORAL
COMMUNITY
Start: 2006-11-20 | End: 2018-03-23 | Stop reason: ALTCHOICE

## 2018-03-22 RX ORDER — IBUPROFEN 400 MG/1
400 TABLET ORAL DAILY PRN
COMMUNITY
Start: 2015-10-08 | End: 2020-08-27 | Stop reason: CLARIF

## 2018-03-22 RX ORDER — FLUTICASONE PROPIONATE 50 MCG
1 SPRAY, SUSPENSION (ML) NASAL 2 TIMES DAILY
COMMUNITY
Start: 2015-03-20 | End: 2019-07-29 | Stop reason: SDUPTHER

## 2018-03-22 RX ORDER — LANOLIN ALCOHOL/MO/W.PET/CERES
CREAM (GRAM) TOPICAL
COMMUNITY
End: 2018-09-19 | Stop reason: HOSPADM

## 2018-03-22 RX ORDER — VALSARTAN 320 MG/1
1 TABLET ORAL DAILY
COMMUNITY
Start: 2014-10-20 | End: 2018-07-13 | Stop reason: SDUPTHER

## 2018-03-22 RX ORDER — ALBUTEROL SULFATE 90 UG/1
AEROSOL, METERED RESPIRATORY (INHALATION)
COMMUNITY
Start: 2015-03-20 | End: 2019-07-29 | Stop reason: SDUPTHER

## 2018-03-22 RX ORDER — MULTIVIT WITH MINERALS/LUTEIN
TABLET ORAL
COMMUNITY
End: 2018-09-19 | Stop reason: HOSPADM

## 2018-03-23 ENCOUNTER — OFFICE VISIT (OUTPATIENT)
Dept: INTERNAL MEDICINE CLINIC | Facility: CLINIC | Age: 68
End: 2018-03-23
Payer: COMMERCIAL

## 2018-03-23 ENCOUNTER — TELEPHONE (OUTPATIENT)
Dept: NEUROLOGY | Facility: CLINIC | Age: 68
End: 2018-03-23

## 2018-03-23 VITALS
DIASTOLIC BLOOD PRESSURE: 99 MMHG | BODY MASS INDEX: 44.92 KG/M2 | OXYGEN SATURATION: 95 % | SYSTOLIC BLOOD PRESSURE: 144 MMHG | HEART RATE: 71 BPM | HEIGHT: 67 IN | WEIGHT: 286.2 LBS

## 2018-03-23 DIAGNOSIS — J45.909 MODERATE ASTHMA WITHOUT COMPLICATION, UNSPECIFIED WHETHER PERSISTENT: ICD-10-CM

## 2018-03-23 DIAGNOSIS — F32.A DEPRESSION, UNSPECIFIED DEPRESSION TYPE: ICD-10-CM

## 2018-03-23 DIAGNOSIS — E78.2 MIXED HYPERLIPIDEMIA: ICD-10-CM

## 2018-03-23 DIAGNOSIS — I10 ESSENTIAL HYPERTENSION: ICD-10-CM

## 2018-03-23 DIAGNOSIS — R41.3 MEMORY LOSS: ICD-10-CM

## 2018-03-23 DIAGNOSIS — K21.9 GERD WITHOUT ESOPHAGITIS: Primary | ICD-10-CM

## 2018-03-23 DIAGNOSIS — E03.9 ACQUIRED HYPOTHYROIDISM: ICD-10-CM

## 2018-03-23 PROCEDURE — 3725F SCREEN DEPRESSION PERFORMED: CPT | Performed by: INTERNAL MEDICINE

## 2018-03-23 PROCEDURE — 99214 OFFICE O/P EST MOD 30 MIN: CPT | Performed by: INTERNAL MEDICINE

## 2018-03-23 RX ORDER — LEVOTHYROXINE SODIUM 175 UG/1
175 TABLET ORAL DAILY
COMMUNITY
End: 2019-03-15 | Stop reason: SDUPTHER

## 2018-03-23 NOTE — PROGRESS NOTES
Assessment/Plan:  Regarding her hypertension she is stable at the present time I will continue her current medicines  She needs to lose weight she was well aware that  She has depression which is reasonably stable  She does have a lot of responsibility taking care of her  who has terminal cancer  Regarding her decreased memory she is going to be referred to Neurology for evaluation  She will continue monitor her lab tests and I will see her back in 4 months  No problem-specific Assessment & Plan notes found for this encounter  Diagnoses and all orders for this visit:    GERD without esophagitis  -     Basic metabolic panel; Future    Moderate asthma without complication, unspecified whether persistent    Essential hypertension    Depression, unspecified depression type    Mixed hyperlipidemia    Memory loss  -     Ambulatory referral to Neurology; Future    Acquired hypothyroidism  -     TSH, 3rd generation; Future  -     T4, free; Future    Other orders  -     metoprolol succinate (TOPROL-XL) 50 mg 24 hr tablet; Take 1 tablet by mouth daily  -     pantoprazole (PROTONIX) 40 mg tablet; Take 1 tablet by mouth daily  -     Pediatric Multivitamins-Fl (MULTIVITAMINS/FL PO); Take by mouth  -     albuterol (PROAIR HFA) 90 mcg/act inhaler; Inhale  -     tobramycin (TOBREX) 0 3 % SOLN; Apply 1 drop to eye 4 (four) times a day  -     escitalopram (LEXAPRO) 20 mg tablet; Take 1 tablet by mouth daily  -     Omega-3 Fatty Acids (FISH OIL) 1,000 mg; Take by mouth  -     fluticasone (FLONASE) 50 mcg/act nasal spray; 1 spray into each nostril 2 (two) times a day  -     ibuprofen (MOTRIN) 400 mg tablet; Take 400 mg by mouth  -     Mometasone Furo-Formoterol Fum (DULERA) 200-5 MCG/ACT AERO; Inhale  -     vitamin E, tocopherol, 1,000 units capsule; Take by mouth  -     Ascorbic Acid (VITAMIN C) 100 MG tablet; Take by mouth  -     cyanocobalamin (VITAMIN B-12) 1,000 mcg tablet;  Take by mouth  -     valsartan (DIOVAN) 320 MG tablet; Take 1 tablet by mouth daily  -     Discontinue: citalopram (CELEXA) 40 mg tablet; Take by mouth  -     Hm Colonoscopy  -     levothyroxine 175 mcg tablet; Take 175 mcg by mouth daily TAKE 1 TABLET Saturday AND Sunday  Subjective:  Problems are 1  Depression 2  Hypertension 3  Hyperlipidemia 4  History of thyroid cancer 5  Obesity     Patient ID: Alexandru Vogel is a 79 y o  female  HPI she comes in for follow-up  Her only complaint that is new is that she feels her memory isn't as good as it was  Her  relates that she is forgetting things more than she used to  She is 79years old at this point  She is hypothyroid by virtue of thyroid surgery for malignancy many years ago  She has not had any cardiopulmonary complaints  The following portions of the patient's history were reviewed and updated as appropriate: allergies, current medications, past family history, past medical history, past social history, past surgical history and problem list     Review of Systems   Constitutional: Positive for fatigue  Negative for activity change, appetite change, chills, diaphoresis, fever and unexpected weight change  She remains morbidly obese  She does not follow any diet  HENT: Negative for congestion, ear pain, hearing loss, mouth sores, nosebleeds, postnasal drip, sinus pain, sinus pressure, sore throat and trouble swallowing  Eyes: Negative for pain, discharge and visual disturbance  Respiratory: Positive for shortness of breath  Negative for apnea, cough, chest tightness and wheezing  Cardiovascular: Negative for chest pain, palpitations and leg swelling  Gastrointestinal: Negative for abdominal pain, anal bleeding, blood in stool, constipation, diarrhea, nausea and vomiting  Endocrine: Negative for polydipsia and polyphagia  Genitourinary: Negative for decreased urine volume, dysuria, flank pain, frequency, hematuria and urgency  Musculoskeletal: Negative for arthralgias, back pain, gait problem, joint swelling and myalgias  Skin: Negative for rash and wound  Allergic/Immunologic: Negative for environmental allergies and food allergies  Neurological: Negative for dizziness, tremors, seizures, syncope, speech difficulty, light-headedness, numbness and headaches  She feels her memory is somewhat less good that he used to be  Hematological: Negative for adenopathy  Does not bruise/bleed easily  Psychiatric/Behavioral: Negative for agitation, confusion, hallucinations, sleep disturbance and suicidal ideas  The patient is not nervous/anxious  Objective:     Physical Exam   Constitutional: No distress  She is markedly overweight  Her blood pressure is 130/84  HENT:   Head: Normocephalic  Right Ear: External ear normal    Left Ear: External ear normal    Nose: Nose normal    Mouth/Throat: Oropharynx is clear and moist  No oropharyngeal exudate  Eyes: Conjunctivae and EOM are normal  Pupils are equal, round, and reactive to light  Right eye exhibits no discharge  Left eye exhibits no discharge  Neck: Normal range of motion  Neck supple  No thyromegaly present  Cardiovascular: Normal rate, regular rhythm, normal heart sounds and intact distal pulses  Exam reveals no gallop and no friction rub  No murmur heard  Pulmonary/Chest: Effort normal and breath sounds normal  No respiratory distress  She has no wheezes  She has no rales  Abdominal: Soft  Bowel sounds are normal  She exhibits no distension and no mass  There is no tenderness  There is no rebound and no guarding  Abdomen is markedly obese   Musculoskeletal: Normal range of motion  She exhibits edema  She exhibits no tenderness or deformity  She has a trace of peripheral edema  Lymphadenopathy:     She has no cervical adenopathy  Neurological: She is alert  She has normal reflexes  No cranial nerve deficit   Coordination normal    Skin: Skin is warm and dry  No rash noted  No erythema  Psychiatric: She has a normal mood and affect  Her behavior is normal  Judgment and thought content normal    Nursing note and vitals reviewed

## 2018-05-07 ENCOUNTER — TELEPHONE (OUTPATIENT)
Dept: NEUROLOGY | Facility: CLINIC | Age: 68
End: 2018-05-07

## 2018-05-08 ENCOUNTER — TELEPHONE (OUTPATIENT)
Dept: NEUROLOGY | Facility: CLINIC | Age: 68
End: 2018-05-08

## 2018-07-01 DIAGNOSIS — E78.00 PURE HYPERCHOLESTEROLEMIA: ICD-10-CM

## 2018-07-02 RX ORDER — SIMVASTATIN 20 MG
TABLET ORAL
Qty: 90 TABLET | Refills: 2 | Status: SHIPPED | OUTPATIENT
Start: 2018-07-02 | End: 2019-04-25 | Stop reason: SDUPTHER

## 2018-07-10 DIAGNOSIS — K21.9 GASTROESOPHAGEAL REFLUX DISEASE WITHOUT ESOPHAGITIS: Primary | ICD-10-CM

## 2018-07-10 RX ORDER — PANTOPRAZOLE SODIUM 40 MG/1
TABLET, DELAYED RELEASE ORAL
Qty: 30 TABLET | Refills: 5 | Status: SHIPPED | OUTPATIENT
Start: 2018-07-10 | End: 2019-02-08 | Stop reason: SDUPTHER

## 2018-07-13 DIAGNOSIS — I10 ESSENTIAL HYPERTENSION: ICD-10-CM

## 2018-07-13 DIAGNOSIS — F32.A DEPRESSION, UNSPECIFIED DEPRESSION TYPE: Primary | ICD-10-CM

## 2018-07-13 RX ORDER — VALSARTAN 320 MG/1
TABLET ORAL
Qty: 90 TABLET | Refills: 1 | Status: SHIPPED | OUTPATIENT
Start: 2018-07-13 | End: 2019-02-22 | Stop reason: SDUPTHER

## 2018-07-13 RX ORDER — ESCITALOPRAM OXALATE 20 MG/1
TABLET ORAL
Qty: 90 TABLET | Refills: 1 | Status: SHIPPED | OUTPATIENT
Start: 2018-07-13 | End: 2019-02-22 | Stop reason: SDUPTHER

## 2018-07-25 ENCOUNTER — TELEPHONE (OUTPATIENT)
Dept: NEUROLOGY | Facility: CLINIC | Age: 68
End: 2018-07-25

## 2018-09-17 DIAGNOSIS — J41.0 SIMPLE CHRONIC BRONCHITIS (HCC): Primary | ICD-10-CM

## 2018-09-17 RX ORDER — MOMETASONE FUROATE AND FORMOTEROL FUMARATE DIHYDRATE 200; 5 UG/1; UG/1
AEROSOL RESPIRATORY (INHALATION)
Qty: 39 INHALER | Refills: 3 | Status: SHIPPED | OUTPATIENT
Start: 2018-09-17 | End: 2019-07-29 | Stop reason: SDUPTHER

## 2018-09-19 ENCOUNTER — OFFICE VISIT (OUTPATIENT)
Dept: NEUROLOGY | Facility: CLINIC | Age: 68
End: 2018-09-19
Payer: COMMERCIAL

## 2018-09-19 VITALS
HEIGHT: 68 IN | BODY MASS INDEX: 43.35 KG/M2 | SYSTOLIC BLOOD PRESSURE: 148 MMHG | WEIGHT: 286 LBS | DIASTOLIC BLOOD PRESSURE: 94 MMHG | HEART RATE: 71 BPM

## 2018-09-19 DIAGNOSIS — R41.3 MEMORY DIFFICULTY: Primary | ICD-10-CM

## 2018-09-19 DIAGNOSIS — R20.2 NUMBNESS AND TINGLING: ICD-10-CM

## 2018-09-19 DIAGNOSIS — R20.0 NUMBNESS AND TINGLING: ICD-10-CM

## 2018-09-19 DIAGNOSIS — R26.89 BALANCE PROBLEM: ICD-10-CM

## 2018-09-19 PROCEDURE — 99204 OFFICE O/P NEW MOD 45 MIN: CPT | Performed by: PSYCHIATRY & NEUROLOGY

## 2018-09-19 NOTE — PROGRESS NOTES
Azeem Simmons is a 76 y o  female  Chief Complaint   Patient presents with    Memory Loss    Balance Problem       Assessment:  1  Memory difficulty    2  Balance problem    3  Numbness and tingling        Plan:    Discussion:  Differential diagnosis of her memory difficulty discussed with the patient, patient's [place] is 27/30 and she has a lot of stress in her life including taking care of her  who is having terminal cancer and that could be contributing to some of her cognitive issues but other than a true memory issue but it is in the differential diagnosis, if she continues to have the symptoms in the future may benefit from a detailed neuropsych testing, the also differential diagnosis of her balance disorder discussed with the patient, would recommend an MRI scan of the brain and C-spine and blood work to evaluate for her symptoms, would recommend EMG nerve conduction study of bilateral upper extremity to evaluate for carpal tunnel syndrome, patient to call me after the above test to discuss the results, she was advised to take fall and safety precautions, also was advised mentally stimulating exercises, she is not keen to go for physical therapy, she was encouraged to continue using the cane, to go to the hospital if has any worsening symptoms and call me otherwise to see me back in 2 months and follow up with her other physicians      Subjective:    HPI   Patient is here for evaluation of memory issues including slight difficulty in attention and concentration, balance issues and numbness and tingling in the hands, according to the patient she does not have memory issues, she has been having slight difficulty with her memory due to being under stress, and being sleep deprived since her  is not doing good and has terminal cancer, she also has been having balance issues especially while walking on uneven surfaces, she has not had any falls, denies any bowel or bladder incontinence, she does complain of some occasional neck pain, she also has numbness and tingling in both hands especially the right worse than the left course in the morning after she wakes up and has to shake her hands and sometimes she would have things fall out of her hand, no other complaints  Vitals:    09/19/18 1319   BP: 148/94   Pulse: 71   Weight: 130 kg (286 lb)   Height: 5' 7 5" (1 715 m)       Current Medications    Current Outpatient Prescriptions:     albuterol (PROAIR HFA) 90 mcg/act inhaler, Inhale, Disp: , Rfl:     DULERA 200-5 MCG/ACT inhaler, INHALE 2 PUFFS BY MOUTH TWO TIMES DAILY, Disp: 39 Inhaler, Rfl: 3    escitalopram (LEXAPRO) 20 mg tablet, TAKE 1 TABLET BY MOUTH EVERY DAY, Disp: 90 tablet, Rfl: 1    fluticasone (FLONASE) 50 mcg/act nasal spray, 1 spray into each nostril 2 (two) times a day, Disp: , Rfl:     ibuprofen (MOTRIN) 400 mg tablet, Take 400 mg by mouth, Disp: , Rfl:     levothyroxine 150 mcg tablet, TAKE 1 TABLET DAILY  (Patient taking differently: TAKE 1 TABLET [address]), Disp: 90 tablet, Rfl: 1    levothyroxine 175 mcg tablet, Take 175 mcg by mouth daily TAKE 1 TABLET Saturday AND Sunday  , Disp: , Rfl:     metoprolol succinate (TOPROL-XL) 50 mg 24 hr tablet, Take 1 tablet by mouth daily, Disp: , Rfl:     pantoprazole (PROTONIX) 40 mg tablet, TAKE 1 TABLET EVERY DAY, Disp: 30 tablet, Rfl: 5    simvastatin (ZOCOR) 20 mg tablet, TAKE 1 TABLET EVERY DAY, Disp: 90 tablet, Rfl: 2    tobramycin (TOBREX) 0 3 % SOLN, Apply 1 drop to eye 4 (four) times a day, Disp: , Rfl:     valsartan (DIOVAN) 320 MG tablet, TAKE 1 TABLET EVERY DAY, Disp: 90 tablet, Rfl: 1      Allergies  Lisinopril and Loop diuretics    Past Medical History  Past Medical History:   Diagnosis Date    Allergic rhinitis     Asthma     Carpal tunnel syndrome     Deviated septum     Disc degeneration, lumbar     Generalized osteoarthritis of hand     Hepatitis     History of closed fracture     bone    History of malignant neoplasm of thyroid     Hypertension     Hypothyroidism     Nontoxic goiter     Obesity     HEIDI (obstructive sleep apnea)     Thyroid cancer (HCC)          Past Surgical History:  Past Surgical History:   Procedure Laterality Date    KNEE SURGERY      POPLITEAL SYNOVIAL CYST EXCISION      THYROID SURGERY      TONSILLECTOMY           Family History:  Family History   Problem Relation Age of Onset    Arrhythmia Mother         Myocardial Infarction Arrhythmias    Alcohol abuse Father     Cirrhosis Family         hepatic       Social History:   reports that she has never smoked  She has never used smokeless tobacco  She reports that she does not drink alcohol or use drugs  I have reviewed the past medical history, surgical history, social and family history, current medications, allergies vitals, review of systems, and updated this information as appropriate today  Objective:    Physical Exam    Neurological Exam  Patient is alert awake oriented, high functions are intact, speech is fluent  No evidence of any aphasia or dysarthria  [place] is 27/30  Cranial nerve examination reveals visual fields are full to threat, pupils equal and reactive, extraocular movements intact, fundi showed sharp disc margins limited exam secondary to constricted pupils, sensation in the V1 V2 V3 distribution is symmetric, no obvious facial asymmetry noted, tongue is midline and gag is adequate  Hearing is intact bilaterally, shoulder shrug is intact bilaterally  Motor examination reveals normal tone and bulk, no evidence of any drift to the outstretched extremities, strength is 5/5 preserved bilaterally in both upper and lower extremities, deep tendon reflexes are intact, toes are downgoing  Sensory examination to pinprick light touch proprioception and vibration is preserved bilaterally, patient does not extinguish double simultaneous stimuli    Coordination no evidence of any finger-to-nose dysmetria, no evidence of any dysdiadochokinesia,  Ambulates with a cane  Romberg is negative  Some paraspinal muscle tenderness in the cervical area    ROS:  Review of Systems   Constitutional: Positive for fatigue  HENT: Positive for congestion and sinus pressure  Eyes: Negative  Respiratory: Positive for shortness of breath  Cardiovascular: Negative  Gastrointestinal: Negative  Endocrine: Negative  Genitourinary: Negative  Musculoskeletal: Positive for gait problem and joint swelling  Skin: Negative  Allergic/Immunologic: Negative  Neurological: Positive for numbness  Hematological: Negative

## 2018-10-04 ENCOUNTER — HOSPITAL ENCOUNTER (OUTPATIENT)
Dept: MRI IMAGING | Facility: CLINIC | Age: 68
Discharge: HOME | End: 2018-10-04
Payer: COMMERCIAL

## 2018-10-04 ENCOUNTER — APPOINTMENT (OUTPATIENT)
Dept: LAB | Facility: CLINIC | Age: 68
End: 2018-10-04
Payer: COMMERCIAL

## 2018-10-04 DIAGNOSIS — R41.3 MEMORY DIFFICULTY: ICD-10-CM

## 2018-10-04 DIAGNOSIS — E03.9 ACQUIRED HYPOTHYROIDISM: ICD-10-CM

## 2018-10-04 DIAGNOSIS — K21.9 GERD WITHOUT ESOPHAGITIS: ICD-10-CM

## 2018-10-04 DIAGNOSIS — R26.89 BALANCE PROBLEM: ICD-10-CM

## 2018-10-04 LAB
ANION GAP SERPL CALCULATED.3IONS-SCNC: 4 MMOL/L (ref 4–13)
BUN SERPL-MCNC: 10 MG/DL (ref 5–25)
CALCIUM SERPL-MCNC: 8.8 MG/DL (ref 8.3–10.1)
CHLORIDE SERPL-SCNC: 103 MMOL/L (ref 100–108)
CO2 SERPL-SCNC: 32 MMOL/L (ref 21–32)
CREAT SERPL-MCNC: 0.64 MG/DL (ref 0.6–1.3)
FOLATE SERPL-MCNC: 13.8 NG/ML (ref 3.1–17.5)
GFR SERPL CREATININE-BSD FRML MDRD: 92 ML/MIN/1.73SQ M
GLUCOSE P FAST SERPL-MCNC: 171 MG/DL (ref 65–99)
POTASSIUM SERPL-SCNC: 3.9 MMOL/L (ref 3.5–5.3)
SODIUM SERPL-SCNC: 139 MMOL/L (ref 136–145)
T4 FREE SERPL-MCNC: 1.09 NG/DL (ref 0.76–1.46)
TSH SERPL DL<=0.05 MIU/L-ACNC: 0.62 UIU/ML (ref 0.36–3.74)
VIT B12 SERPL-MCNC: 657 PG/ML (ref 100–900)

## 2018-10-04 PROCEDURE — 84439 ASSAY OF FREE THYROXINE: CPT

## 2018-10-04 PROCEDURE — 86592 SYPHILIS TEST NON-TREP QUAL: CPT

## 2018-10-04 PROCEDURE — 36415 COLL VENOUS BLD VENIPUNCTURE: CPT

## 2018-10-04 PROCEDURE — 84443 ASSAY THYROID STIM HORMONE: CPT

## 2018-10-04 PROCEDURE — 82746 ASSAY OF FOLIC ACID SERUM: CPT

## 2018-10-04 PROCEDURE — 80048 BASIC METABOLIC PNL TOTAL CA: CPT

## 2018-10-04 PROCEDURE — 86618 LYME DISEASE ANTIBODY: CPT

## 2018-10-04 PROCEDURE — 82607 VITAMIN B-12: CPT

## 2018-10-04 PROCEDURE — 70551 MRI BRAIN STEM W/O DYE: CPT

## 2018-10-05 LAB
B BURGDOR IGG SER IA-ACNC: 0.78
B BURGDOR IGM SER IA-ACNC: 0.08
RPR SER QL: NORMAL

## 2018-11-05 ENCOUNTER — HOSPITAL ENCOUNTER (OUTPATIENT)
Dept: MRI IMAGING | Facility: HOSPITAL | Age: 68
Discharge: HOME | End: 2018-11-05
Attending: PSYCHIATRY & NEUROLOGY
Payer: COMMERCIAL

## 2018-11-05 PROCEDURE — 72141 MRI NECK SPINE W/O DYE: CPT

## 2018-11-06 ENCOUNTER — TELEPHONE (OUTPATIENT)
Dept: NEUROLOGY | Facility: CLINIC | Age: 68
End: 2018-11-06

## 2018-11-08 ENCOUNTER — TELEPHONE (OUTPATIENT)
Dept: NEUROLOGY | Facility: CLINIC | Age: 68
End: 2018-11-08

## 2018-11-09 NOTE — TELEPHONE ENCOUNTER
Jens Klinefelter, MD   [facility] [place] [facility]             Please call the patient regarding her abnormal result   Left multiple messages for the patient to call back, please connect me next week to speak to the patient

## 2018-11-15 NOTE — TELEPHONE ENCOUNTER
attempted to call cell- not accepting calls at this time      lm on home number for pt to call office back

## 2018-11-16 NOTE — TELEPHONE ENCOUNTER
Attempted to call cell- not accepting calls at this time  lmom at home number for pt to call office back      Letter mailed home

## 2018-12-11 RX ORDER — CHLORAL HYDRATE 500 MG
CAPSULE ORAL
COMMUNITY
End: 2018-12-12

## 2018-12-11 RX ORDER — LANOLIN ALCOHOL/MO/W.PET/CERES
1 CREAM (GRAM) TOPICAL DAILY
COMMUNITY
End: 2018-12-12

## 2018-12-11 RX ORDER — MULTIVIT WITH MINERALS/LUTEIN
1 TABLET ORAL DAILY
COMMUNITY
End: 2018-12-12

## 2018-12-11 RX ORDER — RIBOFLAVIN (VITAMIN B2) 100 MG
1 TABLET ORAL DAILY
COMMUNITY
End: 2018-12-12

## 2018-12-11 RX ORDER — METOPROLOL TARTRATE 50 MG/1
TABLET, FILM COATED ORAL
COMMUNITY
End: 2018-12-12

## 2018-12-11 RX ORDER — CLOTRIMAZOLE AND BETAMETHASONE DIPROPIONATE 10; .64 MG/G; MG/G
CREAM TOPICAL
Refills: 6 | COMMUNITY
Start: 2018-09-20 | End: 2018-12-12

## 2018-12-12 ENCOUNTER — OFFICE VISIT (OUTPATIENT)
Dept: NEUROLOGY | Facility: CLINIC | Age: 68
End: 2018-12-12
Payer: COMMERCIAL

## 2018-12-12 VITALS
HEART RATE: 74 BPM | DIASTOLIC BLOOD PRESSURE: 80 MMHG | WEIGHT: 285 LBS | BODY MASS INDEX: 43.19 KG/M2 | HEIGHT: 68 IN | SYSTOLIC BLOOD PRESSURE: 132 MMHG

## 2018-12-12 DIAGNOSIS — R41.3 MEMORY DIFFICULTY: Primary | ICD-10-CM

## 2018-12-12 DIAGNOSIS — R20.0 NUMBNESS AND TINGLING: ICD-10-CM

## 2018-12-12 DIAGNOSIS — R20.2 NUMBNESS AND TINGLING: ICD-10-CM

## 2018-12-12 PROCEDURE — 99213 OFFICE O/P EST LOW 20 MIN: CPT | Performed by: PSYCHIATRY & NEUROLOGY

## 2018-12-12 PROCEDURE — 4040F PNEUMOC VAC/ADMIN/RCVD: CPT | Performed by: PSYCHIATRY & NEUROLOGY

## 2018-12-12 NOTE — PROGRESS NOTES
Christiano Martinez is a 76 y o  female  Chief Complaint   Patient presents with    Memory Loss       Assessment:  1  Memory difficulty    2  Numbness and tingling        Plan:    Discussion:  Differential diagnosis of her memory difficulty discussed with the patient, her [place] on the last exam was 27/30, I am not sure if she is having true cognitive impairment versus secondary to stress as her  is having terminal cancer and that could be contributing to some of her cognitive issues, her balance seems to be under control, her numbness and tingling is not bothersome and she is not keen to have an EMG study done at this time, her MRI scan of the brain and C-spine results were reviewed with her, she is not keen to see a neurosurgeon, to go to the hospital if has any worsening symptoms and call me otherwise to see me back in 3-4 months and follow up with her other physicians  Subjective:    HPI     Patient is here in follow-up for her memory difficulty, including side difficulty in attention and concentration, and balance issues and numbness and tingling in the hands, according to the patient she has difficulty with short-term memory and is not sure if that is due to stress secondary to her  having terminal cancer, she denies having any balance issues at this time, she is using a cane to ambulate, no bowel and bladder incontinence, her numbness and tingling in the hands is under control, no focal weakness, no other complaints    Vitals:    12/12/18 1202   BP: 132/80   BP Location: Left arm   Patient Position: Sitting   Cuff Size: Large   Pulse: 74   Weight: 129 kg (285 lb)   Height: 5' 7 5" (1 715 m)       Current Medications    Current Outpatient Prescriptions:     albuterol (PROAIR HFA) 90 mcg/act inhaler, Inhale, Disp: , Rfl:     DULERA 200-5 MCG/ACT inhaler, INHALE 2 PUFFS BY MOUTH TWO TIMES DAILY, Disp: 39 Inhaler, Rfl: 3    escitalopram (LEXAPRO) 20 mg tablet, TAKE 1 TABLET BY MOUTH EVERY DAY, Disp: 90 tablet, Rfl: 1    fluticasone (FLONASE) 50 mcg/act nasal spray, 1 spray into each nostril 2 (two) times a day, Disp: , Rfl:     ibuprofen (MOTRIN) 400 mg tablet, Take 400 mg by mouth daily as needed  , Disp: , Rfl:     levothyroxine 150 mcg tablet, TAKE 1 TABLET DAILY  (Patient taking differently: TAKE 1 TABLET [address]), Disp: 90 tablet, Rfl: 1    levothyroxine 175 mcg tablet, Take 175 mcg by mouth daily TAKE 1 TABLET Saturday AND Sunday  , Disp: , Rfl:     metoprolol succinate (TOPROL-XL) 50 mg 24 hr tablet, Take 1 tablet by mouth daily, Disp: , Rfl:     pantoprazole (PROTONIX) 40 mg tablet, TAKE 1 TABLET EVERY DAY, Disp: 30 tablet, Rfl: 5    simvastatin (ZOCOR) 20 mg tablet, TAKE 1 TABLET EVERY DAY, Disp: 90 tablet, Rfl: 2    valsartan (DIOVAN) 320 MG tablet, TAKE 1 TABLET EVERY DAY, Disp: 90 tablet, Rfl: 1      Allergies  Grapefruit extract; Lisinopril; Loop diuretics; and Pamabrom    Past Medical History  Past Medical History:   Diagnosis Date    Allergic rhinitis     Asthma     Carpal tunnel syndrome     Deviated septum     Disc degeneration, lumbar     Generalized osteoarthritis of hand     Hepatitis     History of closed fracture     bone    History of malignant neoplasm of thyroid     Hypertension     Hypothyroidism     Nontoxic goiter     Obesity     HEIDI (obstructive sleep apnea)     Thyroid cancer (HCC)          Past Surgical History:  Past Surgical History:   Procedure Laterality Date    KNEE SURGERY      POPLITEAL SYNOVIAL CYST EXCISION      THYROID SURGERY      TONSILLECTOMY           Family History:  Family History   Problem Relation Age of Onset    Arrhythmia Mother         Myocardial Infarction Arrhythmias    Heart attack Mother     Alcohol abuse Father     Cirrhosis Father     Cirrhosis Family         hepatic    Heart disease Brother     Hypertension Brother        Social History:   reports that she has never smoked   She has never used smokeless tobacco  She reports that she does not drink alcohol or use drugs  I have reviewed the past medical history, surgical history, social and family history, current medications, allergies vitals, review of systems, and updated this information as appropriate today  Objective:    Physical Exam    Neurological Exam    GENERAL:  Cooperative in no acute distress  Well-developed and well-nourished    HEAD and NECK   Head is atraumatic normocephalic with no lesions or masses  Neck is supple with full range of motion    CARDIOVASCULAR  Carotid Arteries-no carotid bruits  NEUROLOGIC:  Mental Status-the patient is awake alert and oriented without aphasia or apraxia  Cranial Nerves: Visual fields are full to confrontation  Extraocular movements are full without nystagmus  Pupils are 2-1/2 mm and reactive  Face is symmetrical to light touch  Movements of facial expression move symmetrically  Hearing is normal to finger rub bilaterally  Soft palate lifts symmetrically  Shoulder shrug is symmetrical  Tongue is midline without atrophy  Motor: No drift is noted on arm extension  Strength is full in the upper and lower extremities with normal bulk and tone  Sensory: Intact to temperature and vibratory sensation in the upper and lower extremities bilaterally  Cortical function is intact  Ambulates with a cane  Reflexes:     1+ and symmetrical          ROS:  Review of Systems   Constitutional: Positive for fatigue  Negative for appetite change and fever  HENT: Positive for sinus pain  Negative for hearing loss, tinnitus, trouble swallowing and voice change  Eyes: Negative  Negative for photophobia, pain and visual disturbance  Respiratory: Negative  Negative for shortness of breath  Cardiovascular: Negative  Negative for chest pain and palpitations  Gastrointestinal: Positive for diarrhea  Negative for abdominal pain, constipation, nausea and vomiting  Endocrine: Negative    Negative for cold intolerance and heat intolerance  Genitourinary: Positive for flank pain and urgency  Negative for dysuria and frequency  Musculoskeletal: Positive for back pain and gait problem  Negative for myalgias and neck pain  Skin: Negative  Negative for rash  Neurological: Positive for numbness and headaches  Negative for dizziness, tremors, seizures, syncope, facial asymmetry, speech difficulty, weakness and light-headedness  Hematological: Negative  Does not bruise/bleed easily  Psychiatric/Behavioral: Negative  Negative for confusion, hallucinations and sleep disturbance

## 2018-12-26 ENCOUNTER — TELEPHONE (OUTPATIENT)
Dept: NEUROLOGY | Facility: CLINIC | Age: 68
End: 2018-12-26

## 2019-01-07 ENCOUNTER — APPOINTMENT (OUTPATIENT)
Dept: LAB | Facility: CLINIC | Age: 69
End: 2019-01-07
Payer: COMMERCIAL

## 2019-01-07 ENCOUNTER — OFFICE VISIT (OUTPATIENT)
Dept: INTERNAL MEDICINE CLINIC | Facility: CLINIC | Age: 69
End: 2019-01-07
Payer: COMMERCIAL

## 2019-01-07 VITALS
HEART RATE: 73 BPM | SYSTOLIC BLOOD PRESSURE: 172 MMHG | OXYGEN SATURATION: 95 % | WEIGHT: 275.6 LBS | DIASTOLIC BLOOD PRESSURE: 76 MMHG | BODY MASS INDEX: 41.77 KG/M2 | TEMPERATURE: 98.8 F | HEIGHT: 68 IN

## 2019-01-07 DIAGNOSIS — I10 ESSENTIAL HYPERTENSION: ICD-10-CM

## 2019-01-07 DIAGNOSIS — J11.1 INFLUENZA: Primary | ICD-10-CM

## 2019-01-07 DIAGNOSIS — R73.9 HYPERGLYCEMIA: ICD-10-CM

## 2019-01-07 DIAGNOSIS — E78.5 HYPERLIPIDEMIA, UNSPECIFIED HYPERLIPIDEMIA TYPE: ICD-10-CM

## 2019-01-07 DIAGNOSIS — J11.1 INFLUENZA: ICD-10-CM

## 2019-01-07 PROCEDURE — 80053 COMPREHEN METABOLIC PANEL: CPT

## 2019-01-07 PROCEDURE — 36415 COLL VENOUS BLD VENIPUNCTURE: CPT

## 2019-01-07 PROCEDURE — 1036F TOBACCO NON-USER: CPT | Performed by: INTERNAL MEDICINE

## 2019-01-07 PROCEDURE — 99213 OFFICE O/P EST LOW 20 MIN: CPT | Performed by: INTERNAL MEDICINE

## 2019-01-07 PROCEDURE — 83036 HEMOGLOBIN GLYCOSYLATED A1C: CPT

## 2019-01-07 PROCEDURE — 80061 LIPID PANEL: CPT

## 2019-01-07 PROCEDURE — 3008F BODY MASS INDEX DOCD: CPT | Performed by: INTERNAL MEDICINE

## 2019-01-07 PROCEDURE — 85025 COMPLETE CBC W/AUTO DIFF WBC: CPT

## 2019-01-07 RX ORDER — OSELTAMIVIR PHOSPHATE 75 MG/1
75 CAPSULE ORAL EVERY 12 HOURS SCHEDULED
Qty: 10 CAPSULE | Refills: 0 | Status: SHIPPED | OUTPATIENT
Start: 2019-01-07 | End: 2019-01-12

## 2019-01-07 NOTE — PROGRESS NOTES
Assessment/Plan:  Clinically she has influenza  She received a flu shot 24 hr before the onset of her symptoms  Will put her on Tamiflu for 5 days  Call if not completely improved  She also has hyperglycemia hypertension and hyperlipidemia  She is going to check her labs and see me back here in a month  No results found for this or any previous visit (from the past 1008 hour(s))  1  Influenza  oseltamivir (TAMIFLU) 75 mg capsule    CBC and differential   2  Hyperglycemia  Comprehensive metabolic panel    HEMOGLOBIN A1C W/ EAG ESTIMATION   3  Hyperlipidemia, unspecified hyperlipidemia type  Lipid panel   4  Essential hypertension         Orders Placed This Encounter   Procedures    CBC and differential    Comprehensive metabolic panel    HEMOGLOBIN A1C W/ EAG ESTIMATION    Lipid panel         Subjective:  Cough fever and chills     Patient ID: Li Herrera is a 76 y o  female  HPI she has been ill for 24 hr  She has 3 other members in the house who have documented influenza  She started out with a cough which is nonproductive  Some chills  Did not take her temperature  Some sore throat  No otalgia  No nausea vomiting    The following portions of the patient's history were reviewed and updated as appropriate:   She has a past medical history of Allergic rhinitis; Asthma; Carpal tunnel syndrome; Deviated septum; Disc degeneration, lumbar; Generalized osteoarthritis of hand; Hepatitis; History of closed fracture; History of malignant neoplasm of thyroid; Hypertension; Hypothyroidism; Nontoxic goiter; Obesity; HEIDI (obstructive sleep apnea); and Thyroid cancer ([facility] [address] )  ,   does not have any pertinent problems on file  ,   has a past surgical history that includes Popliteal synovial cyst excision; Thyroid surgery; Tonsillectomy; and Knee surgery  ,  family history includes Alcohol abuse in her father; Arrhythmia in her mother; Cirrhosis in her family and father; Heart attack in her mother; Heart disease in her brother; Hypertension in her brother  ,   reports that she has never smoked  She has never used smokeless tobacco  She reports that she does not drink alcohol or use drugs  ,  is allergic to grapefruit extract; lisinopril; loop diuretics; and pamabrom       Current Outpatient Prescriptions:     albuterol (PROAIR HFA) 90 mcg/act inhaler, Inhale, Disp: , Rfl:     DULERA 200-5 MCG/ACT inhaler, INHALE 2 PUFFS BY MOUTH TWO TIMES DAILY, Disp: 39 Inhaler, Rfl: 3    escitalopram (LEXAPRO) 20 mg tablet, TAKE 1 TABLET BY MOUTH EVERY DAY, Disp: 90 tablet, Rfl: 1    fluticasone (FLONASE) 50 mcg/act nasal spray, 1 spray into each nostril 2 (two) times a day, Disp: , Rfl:     ibuprofen (MOTRIN) 400 mg tablet, Take 400 mg by mouth daily as needed  , Disp: , Rfl:     levothyroxine 150 mcg tablet, TAKE 1 TABLET DAILY  (Patient taking differently: TAKE 1 TABLET [address]), Disp: 90 tablet, Rfl: 1    levothyroxine 175 mcg tablet, Take 175 mcg by mouth daily TAKE 1 TABLET Saturday AND Sunday  , Disp: , Rfl:     metoprolol succinate (TOPROL-XL) 50 mg 24 hr tablet, Take 1 tablet by mouth daily, Disp: , Rfl:     pantoprazole (PROTONIX) 40 mg tablet, TAKE 1 TABLET EVERY DAY, Disp: 30 tablet, Rfl: 5    simvastatin (ZOCOR) 20 mg tablet, TAKE 1 TABLET EVERY DAY, Disp: 90 tablet, Rfl: 2    valsartan (DIOVAN) 320 MG tablet, TAKE 1 TABLET EVERY DAY, Disp: 90 tablet, Rfl: 1    oseltamivir (TAMIFLU) 75 mg capsule, Take 1 capsule (75 mg total) by mouth every 12 (twelve) hours for 5 days, Disp: 10 capsule, Rfl: 0    Review of Systems   Constitutional: Positive for appetite change, chills and fatigue  Negative for fever  HENT: Positive for sore throat  Negative for postnasal drip and sinus pressure  Respiratory: Positive for cough  Negative for chest tightness and wheezing  The cough is minimally productive   Cardiovascular: Negative for chest pain and leg swelling     Gastrointestinal: Positive for abdominal pain  Objective:  BP (!) 172/76 (BP Location: Left arm, Patient Position: Sitting)   Pulse 73   Temp 98 8 °F (37 1 °C)   Ht 5' 7 5" (1 715 m)   Wt 125 kg (275 lb 9 6 oz)   SpO2 95%   BMI 42 53 kg/m²      Physical Exam   Constitutional:   She is markedly overweight  Temperature is 98 8°  Blood pressure is 138/74  HENT:   Head: Normocephalic and atraumatic  Eyes: Pupils are equal, round, and reactive to light  EOM are normal  Right eye exhibits no discharge  Left eye exhibits no discharge  Neck: Normal range of motion  Neck supple  Cardiovascular:   No murmur heard  Heart tones are distant  Pulmonary/Chest: No respiratory distress  She has no wheezes  She has no rales  She exhibits no tenderness  Abdominal: Soft   Bowel sounds are normal

## 2019-01-08 LAB
ALBUMIN SERPL BCP-MCNC: 3.5 G/DL (ref 3.5–5)
ALP SERPL-CCNC: 99 U/L (ref 46–116)
ALT SERPL W P-5'-P-CCNC: 21 U/L (ref 12–78)
ANION GAP SERPL CALCULATED.3IONS-SCNC: 8 MMOL/L (ref 4–13)
AST SERPL W P-5'-P-CCNC: 15 U/L (ref 5–45)
BASOPHILS # BLD AUTO: 0.09 THOUSANDS/ΜL (ref 0–0.1)
BASOPHILS NFR BLD AUTO: 1 % (ref 0–1)
BILIRUB SERPL-MCNC: 1.05 MG/DL (ref 0.2–1)
BUN SERPL-MCNC: 12 MG/DL (ref 5–25)
CALCIUM SERPL-MCNC: 9 MG/DL (ref 8.3–10.1)
CHLORIDE SERPL-SCNC: 104 MMOL/L (ref 100–108)
CHOLEST SERPL-MCNC: 185 MG/DL (ref 50–200)
CO2 SERPL-SCNC: 26 MMOL/L (ref 21–32)
CREAT SERPL-MCNC: 0.85 MG/DL (ref 0.6–1.3)
EOSINOPHIL # BLD AUTO: 0.52 THOUSAND/ΜL (ref 0–0.61)
EOSINOPHIL NFR BLD AUTO: 7 % (ref 0–6)
ERYTHROCYTE [DISTWIDTH] IN BLOOD BY AUTOMATED COUNT: 13.2 % (ref 11.6–15.1)
EST. AVERAGE GLUCOSE BLD GHB EST-MCNC: 180 MG/DL
GFR SERPL CREATININE-BSD FRML MDRD: 71 ML/MIN/1.73SQ M
GLUCOSE SERPL-MCNC: 176 MG/DL (ref 65–140)
HBA1C MFR BLD: 7.9 % (ref 4.2–6.3)
HCT VFR BLD AUTO: 40.9 % (ref 34.8–46.1)
HDLC SERPL-MCNC: 47 MG/DL (ref 40–60)
HGB BLD-MCNC: 13.3 G/DL (ref 11.5–15.4)
IMM GRANULOCYTES # BLD AUTO: 0.02 THOUSAND/UL (ref 0–0.2)
IMM GRANULOCYTES NFR BLD AUTO: 0 % (ref 0–2)
LDLC SERPL CALC-MCNC: 101 MG/DL (ref 0–100)
LYMPHOCYTES # BLD AUTO: 1.29 THOUSANDS/ΜL (ref 0.6–4.47)
LYMPHOCYTES NFR BLD AUTO: 18 % (ref 14–44)
MCH RBC QN AUTO: 27.6 PG (ref 26.8–34.3)
MCHC RBC AUTO-ENTMCNC: 32.5 G/DL (ref 31.4–37.4)
MCV RBC AUTO: 85 FL (ref 82–98)
MONOCYTES # BLD AUTO: 0.68 THOUSAND/ΜL (ref 0.17–1.22)
MONOCYTES NFR BLD AUTO: 10 % (ref 4–12)
NEUTROPHILS # BLD AUTO: 4.52 THOUSANDS/ΜL (ref 1.85–7.62)
NEUTS SEG NFR BLD AUTO: 64 % (ref 43–75)
NONHDLC SERPL-MCNC: 138 MG/DL
NRBC BLD AUTO-RTO: 0 /100 WBCS
PLATELET # BLD AUTO: 294 THOUSANDS/UL (ref 149–390)
PMV BLD AUTO: 11 FL (ref 8.9–12.7)
POTASSIUM SERPL-SCNC: 3.7 MMOL/L (ref 3.5–5.3)
PROT SERPL-MCNC: 7 G/DL (ref 6.4–8.2)
RBC # BLD AUTO: 4.82 MILLION/UL (ref 3.81–5.12)
SODIUM SERPL-SCNC: 138 MMOL/L (ref 136–145)
TRIGL SERPL-MCNC: 187 MG/DL
WBC # BLD AUTO: 7.12 THOUSAND/UL (ref 4.31–10.16)

## 2019-02-08 DIAGNOSIS — K21.9 GASTROESOPHAGEAL REFLUX DISEASE WITHOUT ESOPHAGITIS: ICD-10-CM

## 2019-02-08 RX ORDER — PANTOPRAZOLE SODIUM 40 MG/1
TABLET, DELAYED RELEASE ORAL
Qty: 30 TABLET | Refills: 5 | Status: SHIPPED | OUTPATIENT
Start: 2019-02-08 | End: 2019-08-29 | Stop reason: SDUPTHER

## 2019-02-22 DIAGNOSIS — F32.A DEPRESSION, UNSPECIFIED DEPRESSION TYPE: ICD-10-CM

## 2019-02-22 DIAGNOSIS — I10 ESSENTIAL HYPERTENSION: ICD-10-CM

## 2019-02-22 RX ORDER — VALSARTAN 320 MG/1
TABLET ORAL
Qty: 90 TABLET | Refills: 1 | Status: SHIPPED | OUTPATIENT
Start: 2019-02-22 | End: 2019-08-29 | Stop reason: SDUPTHER

## 2019-02-22 RX ORDER — ESCITALOPRAM OXALATE 20 MG/1
TABLET ORAL
Qty: 90 TABLET | Refills: 1 | Status: SHIPPED | OUTPATIENT
Start: 2019-02-22 | End: 2019-05-24 | Stop reason: SDUPTHER

## 2019-02-22 RX ORDER — METOPROLOL SUCCINATE 50 MG/1
TABLET, EXTENDED RELEASE ORAL
Qty: 90 TABLET | Refills: 3 | Status: SHIPPED | OUTPATIENT
Start: 2019-02-22 | End: 2020-04-13

## 2019-03-15 DIAGNOSIS — C73 THYROID CANCER (HCC): Primary | ICD-10-CM

## 2019-03-15 RX ORDER — LEVOTHYROXINE SODIUM 175 UG/1
175 TABLET ORAL DAILY
Qty: 90 TABLET | Refills: 2 | Status: SHIPPED | OUTPATIENT
Start: 2019-03-15 | End: 2020-01-03 | Stop reason: CLARIF

## 2019-04-25 DIAGNOSIS — E78.00 PURE HYPERCHOLESTEROLEMIA: ICD-10-CM

## 2019-04-25 RX ORDER — SIMVASTATIN 20 MG
TABLET ORAL
Qty: 90 TABLET | Refills: 2 | Status: SHIPPED | OUTPATIENT
Start: 2019-04-25 | End: 2020-01-29

## 2019-05-15 DIAGNOSIS — E03.9 HYPOTHYROIDISM, UNSPECIFIED TYPE: ICD-10-CM

## 2019-05-15 RX ORDER — LEVOTHYROXINE SODIUM 0.15 MG/1
TABLET ORAL
Qty: 90 TABLET | Refills: 1 | Status: SHIPPED | OUTPATIENT
Start: 2019-05-15 | End: 2020-08-07

## 2019-05-22 ENCOUNTER — APPOINTMENT (OUTPATIENT)
Dept: LAB | Facility: CLINIC | Age: 69
End: 2019-05-22
Payer: COMMERCIAL

## 2019-05-22 ENCOUNTER — OFFICE VISIT (OUTPATIENT)
Dept: INTERNAL MEDICINE CLINIC | Facility: CLINIC | Age: 69
End: 2019-05-22
Payer: COMMERCIAL

## 2019-05-22 VITALS
HEIGHT: 68 IN | SYSTOLIC BLOOD PRESSURE: 160 MMHG | BODY MASS INDEX: 40.83 KG/M2 | WEIGHT: 269.4 LBS | OXYGEN SATURATION: 95 % | HEART RATE: 62 BPM | DIASTOLIC BLOOD PRESSURE: 88 MMHG

## 2019-05-22 DIAGNOSIS — E11.9 TYPE 2 DIABETES MELLITUS WITHOUT COMPLICATION, WITHOUT LONG-TERM CURRENT USE OF INSULIN (HCC): ICD-10-CM

## 2019-05-22 DIAGNOSIS — J45.909 MODERATE ASTHMA WITHOUT COMPLICATION, UNSPECIFIED WHETHER PERSISTENT: ICD-10-CM

## 2019-05-22 DIAGNOSIS — K21.9 GERD WITHOUT ESOPHAGITIS: ICD-10-CM

## 2019-05-22 DIAGNOSIS — K21.9 GERD WITHOUT ESOPHAGITIS: Primary | ICD-10-CM

## 2019-05-22 DIAGNOSIS — I10 ESSENTIAL HYPERTENSION: ICD-10-CM

## 2019-05-22 DIAGNOSIS — E78.5 HYPERLIPIDEMIA, UNSPECIFIED HYPERLIPIDEMIA TYPE: ICD-10-CM

## 2019-05-22 DIAGNOSIS — C73 THYROID CANCER (HCC): ICD-10-CM

## 2019-05-22 LAB
ALBUMIN SERPL BCP-MCNC: 3.5 G/DL (ref 3.5–5)
ALP SERPL-CCNC: 76 U/L (ref 46–116)
ALT SERPL W P-5'-P-CCNC: 16 U/L (ref 12–78)
ANION GAP SERPL CALCULATED.3IONS-SCNC: 5 MMOL/L (ref 4–13)
AST SERPL W P-5'-P-CCNC: 11 U/L (ref 5–45)
BASOPHILS # BLD AUTO: 0.08 THOUSANDS/ΜL (ref 0–0.1)
BASOPHILS NFR BLD AUTO: 1 % (ref 0–1)
BILIRUB SERPL-MCNC: 1.03 MG/DL (ref 0.2–1)
BUN SERPL-MCNC: 16 MG/DL (ref 5–25)
CALCIUM SERPL-MCNC: 9.1 MG/DL (ref 8.3–10.1)
CHLORIDE SERPL-SCNC: 105 MMOL/L (ref 100–108)
CHOLEST SERPL-MCNC: 188 MG/DL (ref 50–200)
CO2 SERPL-SCNC: 29 MMOL/L (ref 21–32)
CREAT SERPL-MCNC: 0.82 MG/DL (ref 0.6–1.3)
CREAT UR-MCNC: 73.9 MG/DL
EOSINOPHIL # BLD AUTO: 0.48 THOUSAND/ΜL (ref 0–0.61)
EOSINOPHIL NFR BLD AUTO: 7 % (ref 0–6)
ERYTHROCYTE [DISTWIDTH] IN BLOOD BY AUTOMATED COUNT: 13 % (ref 11.6–15.1)
EST. AVERAGE GLUCOSE BLD GHB EST-MCNC: 171 MG/DL
GFR SERPL CREATININE-BSD FRML MDRD: 74 ML/MIN/1.73SQ M
GLUCOSE P FAST SERPL-MCNC: 149 MG/DL (ref 65–99)
HBA1C MFR BLD: 7.6 % (ref 4.2–6.3)
HCT VFR BLD AUTO: 42.4 % (ref 34.8–46.1)
HDLC SERPL-MCNC: 58 MG/DL (ref 40–60)
HGB BLD-MCNC: 13.6 G/DL (ref 11.5–15.4)
IMM GRANULOCYTES # BLD AUTO: 0.04 THOUSAND/UL (ref 0–0.2)
IMM GRANULOCYTES NFR BLD AUTO: 1 % (ref 0–2)
LDLC SERPL CALC-MCNC: 106 MG/DL (ref 0–100)
LYMPHOCYTES # BLD AUTO: 1.41 THOUSANDS/ΜL (ref 0.6–4.47)
LYMPHOCYTES NFR BLD AUTO: 20 % (ref 14–44)
MCH RBC QN AUTO: 27.5 PG (ref 26.8–34.3)
MCHC RBC AUTO-ENTMCNC: 32.1 G/DL (ref 31.4–37.4)
MCV RBC AUTO: 86 FL (ref 82–98)
MICROALBUMIN UR-MCNC: 12.5 MG/L (ref 0–20)
MICROALBUMIN/CREAT 24H UR: 17 MG/G CREATININE (ref 0–30)
MONOCYTES # BLD AUTO: 0.6 THOUSAND/ΜL (ref 0.17–1.22)
MONOCYTES NFR BLD AUTO: 9 % (ref 4–12)
NEUTROPHILS # BLD AUTO: 4.38 THOUSANDS/ΜL (ref 1.85–7.62)
NEUTS SEG NFR BLD AUTO: 62 % (ref 43–75)
NONHDLC SERPL-MCNC: 130 MG/DL
NRBC BLD AUTO-RTO: 0 /100 WBCS
PLATELET # BLD AUTO: 273 THOUSANDS/UL (ref 149–390)
PMV BLD AUTO: 10.6 FL (ref 8.9–12.7)
POTASSIUM SERPL-SCNC: 4.4 MMOL/L (ref 3.5–5.3)
PROT SERPL-MCNC: 7.1 G/DL (ref 6.4–8.2)
RBC # BLD AUTO: 4.95 MILLION/UL (ref 3.81–5.12)
SODIUM SERPL-SCNC: 139 MMOL/L (ref 136–145)
TRIGL SERPL-MCNC: 119 MG/DL
WBC # BLD AUTO: 6.99 THOUSAND/UL (ref 4.31–10.16)

## 2019-05-22 PROCEDURE — 82570 ASSAY OF URINE CREATININE: CPT | Performed by: INTERNAL MEDICINE

## 2019-05-22 PROCEDURE — 82043 UR ALBUMIN QUANTITATIVE: CPT | Performed by: INTERNAL MEDICINE

## 2019-05-22 PROCEDURE — 85025 COMPLETE CBC W/AUTO DIFF WBC: CPT

## 2019-05-22 PROCEDURE — 80053 COMPREHEN METABOLIC PANEL: CPT

## 2019-05-22 PROCEDURE — 83036 HEMOGLOBIN GLYCOSYLATED A1C: CPT

## 2019-05-22 PROCEDURE — 36415 COLL VENOUS BLD VENIPUNCTURE: CPT

## 2019-05-22 PROCEDURE — 3061F NEG MICROALBUMINURIA REV: CPT | Performed by: INTERNAL MEDICINE

## 2019-05-22 PROCEDURE — 80061 LIPID PANEL: CPT

## 2019-05-22 PROCEDURE — 3725F SCREEN DEPRESSION PERFORMED: CPT | Performed by: INTERNAL MEDICINE

## 2019-05-22 PROCEDURE — 99215 OFFICE O/P EST HI 40 MIN: CPT | Performed by: INTERNAL MEDICINE

## 2019-05-24 DIAGNOSIS — F32.A DEPRESSION, UNSPECIFIED DEPRESSION TYPE: ICD-10-CM

## 2019-05-24 RX ORDER — ESCITALOPRAM OXALATE 20 MG/1
TABLET ORAL
Qty: 90 TABLET | Refills: 1 | Status: SHIPPED | OUTPATIENT
Start: 2019-05-24 | End: 2020-04-06

## 2019-06-12 ENCOUNTER — OFFICE VISIT (OUTPATIENT)
Dept: INTERNAL MEDICINE CLINIC | Facility: CLINIC | Age: 69
End: 2019-06-12
Payer: COMMERCIAL

## 2019-06-12 VITALS
OXYGEN SATURATION: 93 % | HEART RATE: 64 BPM | WEIGHT: 264 LBS | SYSTOLIC BLOOD PRESSURE: 148 MMHG | HEIGHT: 68 IN | BODY MASS INDEX: 40.01 KG/M2 | DIASTOLIC BLOOD PRESSURE: 86 MMHG

## 2019-06-12 DIAGNOSIS — F41.9 ANXIETY: ICD-10-CM

## 2019-06-12 DIAGNOSIS — C73 THYROID CANCER (HCC): ICD-10-CM

## 2019-06-12 DIAGNOSIS — K21.9 GERD WITHOUT ESOPHAGITIS: ICD-10-CM

## 2019-06-12 DIAGNOSIS — E78.5 HYPERLIPIDEMIA, UNSPECIFIED HYPERLIPIDEMIA TYPE: ICD-10-CM

## 2019-06-12 DIAGNOSIS — I10 ESSENTIAL HYPERTENSION: ICD-10-CM

## 2019-06-12 DIAGNOSIS — E11.9 TYPE 2 DIABETES MELLITUS WITHOUT COMPLICATION, WITHOUT LONG-TERM CURRENT USE OF INSULIN (HCC): Primary | ICD-10-CM

## 2019-06-12 DIAGNOSIS — E66.9 OBESITY WITHOUT SERIOUS COMORBIDITY, UNSPECIFIED CLASSIFICATION, UNSPECIFIED OBESITY TYPE: ICD-10-CM

## 2019-06-12 LAB
LEFT EYE DIABETIC RETINOPATHY: NORMAL
LEFT EYE IMAGE QUALITY: NORMAL
LEFT EYE MACULAR EDEMA: NORMAL
LEFT EYE OTHER RETINOPATHY: NORMAL
RIGHT EYE DIABETIC RETINOPATHY: NORMAL
RIGHT EYE IMAGE QUALITY: NORMAL
RIGHT EYE MACULAR EDEMA: NORMAL
RIGHT EYE OTHER RETINOPATHY: NORMAL
SEVERITY (EYE EXAM): NORMAL

## 2019-06-12 PROCEDURE — 99214 OFFICE O/P EST MOD 30 MIN: CPT | Performed by: INTERNAL MEDICINE

## 2019-06-12 PROCEDURE — 1036F TOBACCO NON-USER: CPT | Performed by: INTERNAL MEDICINE

## 2019-06-12 PROCEDURE — 1160F RVW MEDS BY RX/DR IN RCRD: CPT | Performed by: INTERNAL MEDICINE

## 2019-06-12 PROCEDURE — 3008F BODY MASS INDEX DOCD: CPT | Performed by: INTERNAL MEDICINE

## 2019-06-12 PROCEDURE — 92250 FUNDUS PHOTOGRAPHY W/I&R: CPT | Performed by: INTERNAL MEDICINE

## 2019-06-12 PROCEDURE — 3072F LOW RISK FOR RETINOPATHY: CPT | Performed by: INTERNAL MEDICINE

## 2019-06-12 RX ORDER — LORAZEPAM 1 MG/1
1 TABLET ORAL EVERY 8 HOURS PRN
Qty: 10 TABLET | Refills: 0 | Status: SHIPPED | OUTPATIENT
Start: 2019-06-12 | End: 2020-01-03 | Stop reason: CLARIF

## 2019-06-27 DIAGNOSIS — R41.3 MEMORY DIFFICULTY: Primary | ICD-10-CM

## 2019-07-01 ENCOUNTER — TELEPHONE (OUTPATIENT)
Dept: NEUROLOGY | Facility: CLINIC | Age: 69
End: 2019-07-01

## 2019-07-01 ENCOUNTER — OFFICE VISIT (OUTPATIENT)
Dept: NEUROLOGY | Facility: CLINIC | Age: 69
End: 2019-07-01

## 2019-07-01 DIAGNOSIS — R41.3 MEMORY DIFFICULTY: Primary | ICD-10-CM

## 2019-07-01 PROCEDURE — NC001 PR NO CHARGE: Performed by: CLINICAL NEUROPSYCHOLOGIST
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
## 2019-07-01 NOTE — LETTER
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 Infarction Arrhythmias    Heart attack Mother     Alcohol abuse Father     Cirrhosis Father     Cirrhosis Family         hepatic    Heart disease Brother     Hypertension Brother       Other Family History:   · Patient's mother had a series of strokes and some forgetfulness as she got older; No formal diagnosis of dementia; Passed away at age 80  · No other family history of dementia  · Maternal grandfather  in a "mental hospital" for unknown reasons    Behavioral Observations/Mental Status:   Arousal: Awake; Alert; Oriented x3  Prosthetic Devices: Patient wore glasses throughout the evaluation; No hearing aids; Patient ambulated with a single-point cane  Appearance: Appeared stated age;  Of average stature; Clothing was casual and appropriate to the setting and weather conditions  Grooming/Hygiene: Adequately groomed and hygenic  Posture/Gait: Grossly WNL  Motor: Patient with some tremor in L hand; Demonstrated this occurs when she is holding something  Social Relatedness: Patient was pleasant and cooperative throughout the evaluation process; Eye contact and facial expressiveness was WNL  Mood: Reported to be "ok, given the circumstances"  Affect: Generally euthymic; Periods of slight tearfulness noted when discussing her   Thought: Linear; Logical; Goal-directed; No evidence of thought dysfunction during the present evaluation; Patient denied any experience of hallucinations/visual anomalies during the appointment  Speech: Volume, clarity, rate, tone, and prosody were WNL  Insight: Adequate    Plan: Ms Jory Vann presented for comprehensive neuropsychological evaluation  Clinical interview and neuropsychological testing were completed  Tests are to be scored and interpreted with consideration given to the clinical history as described above  A detailed report of findings and impressions will follow  Traci Adame PsyD  Neuropsychologist  [place] Licensed Psychologist  Lic  #[id number]

## 2019-07-22 ENCOUNTER — OFFICE VISIT (OUTPATIENT)
Dept: NEUROLOGY | Facility: CLINIC | Age: 69
End: 2019-07-22
Payer: COMMERCIAL

## 2019-07-22 DIAGNOSIS — R41.3 MEMORY DIFFICULTY: Primary | ICD-10-CM

## 2019-07-22 PROCEDURE — 96132 NRPSYC TST EVAL PHYS/QHP 1ST: CPT | Performed by: CLINICAL NEUROPSYCHOLOGIST

## 2019-07-22 PROCEDURE — 96136 PSYCL/NRPSYC TST PHY/QHP 1ST: CPT | Performed by: CLINICAL NEUROPSYCHOLOGIST

## 2019-07-22 PROCEDURE — 96121 NUBHVL XM PHY/QHP EA ADDL HR: CPT | Performed by: CLINICAL NEUROPSYCHOLOGIST

## 2019-07-22 PROCEDURE — 96116 NUBHVL XM PHYS/QHP 1ST HR: CPT | Performed by: CLINICAL NEUROPSYCHOLOGIST

## 2019-07-22 PROCEDURE — 96137 PSYCL/NRPSYC TST PHY/QHP EA: CPT | Performed by: CLINICAL NEUROPSYCHOLOGIST

## 2019-07-29 ENCOUNTER — OFFICE VISIT (OUTPATIENT)
Dept: PULMONOLOGY | Facility: CLINIC | Age: 69
End: 2019-07-29
Payer: COMMERCIAL

## 2019-07-29 VITALS
OXYGEN SATURATION: 95 % | SYSTOLIC BLOOD PRESSURE: 130 MMHG | WEIGHT: 267 LBS | BODY MASS INDEX: 40.47 KG/M2 | DIASTOLIC BLOOD PRESSURE: 90 MMHG | HEART RATE: 62 BPM | HEIGHT: 68 IN

## 2019-07-29 DIAGNOSIS — E11.9 TYPE 2 DIABETES MELLITUS WITHOUT COMPLICATION, WITHOUT LONG-TERM CURRENT USE OF INSULIN (HCC): Primary | ICD-10-CM

## 2019-07-29 DIAGNOSIS — E66.9 OBESITY (BMI 30-39.9): ICD-10-CM

## 2019-07-29 DIAGNOSIS — G47.33 OSA ON CPAP: Primary | ICD-10-CM

## 2019-07-29 DIAGNOSIS — Z99.89 OSA ON CPAP: Primary | ICD-10-CM

## 2019-07-29 DIAGNOSIS — J41.0 SIMPLE CHRONIC BRONCHITIS (HCC): ICD-10-CM

## 2019-07-29 PROCEDURE — 99214 OFFICE O/P EST MOD 30 MIN: CPT | Performed by: INTERNAL MEDICINE

## 2019-07-29 RX ORDER — BLOOD-GLUCOSE METER
EACH MISCELLANEOUS
Qty: 1 EACH | Refills: 0 | Status: SHIPPED | OUTPATIENT
Start: 2019-07-29

## 2019-07-29 RX ORDER — ALBUTEROL SULFATE 90 UG/1
2 AEROSOL, METERED RESPIRATORY (INHALATION) EVERY 6 HOURS PRN
Qty: 1 INHALER | Refills: 3 | Status: SHIPPED | OUTPATIENT
Start: 2019-07-29 | End: 2020-02-03 | Stop reason: SDUPTHER

## 2019-07-29 RX ORDER — FLUTICASONE PROPIONATE 50 MCG
1 SPRAY, SUSPENSION (ML) NASAL 2 TIMES DAILY
Qty: 1 BOTTLE | Refills: 3 | Status: SHIPPED | OUTPATIENT
Start: 2019-07-29 | End: 2020-09-28 | Stop reason: ALTCHOICE

## 2019-07-29 RX ORDER — LANCETS
EACH MISCELLANEOUS
Qty: 200 EACH | Refills: 3 | Status: SHIPPED | OUTPATIENT
Start: 2019-07-29 | End: 2020-12-03

## 2019-07-30 NOTE — PROGRESS NOTES
Assessment/Plan:   Diagnoses and all orders for this visit:    HEIDI on CPAP  -     PAP DME Resupply/Reorder    Obesity (BMI 30-39  9)    Simple chronic bronchitis (HCC)  -     mometasone-formoterol (DULERA) 200-5 MCG/ACT inhaler; Inhale 2 puffs 2 (two) times a day Rinse mouth after use  -     fluticasone (FLONASE) 50 mcg/act nasal spray; 1 spray into each nostril 2 (two) times a day  -     albuterol (PROAIR HFA) 90 mcg/act inhaler; Inhale 2 puffs every 6 (six) hours as needed for wheezing        HEIDI on CPAP, consistently using it  She states she is well rested with the use of the CPAP  Cleaning of the supplies and change of CPAP supplies discussed  I have given a new order for the supplies  Recommend weight loss  Chronic simple bronchitis, no recent PFTs done states she has been doing well with the continuing the maintenance inhaler not having the need to use a rescue inhaler frequently  She will continue with dulera 2 puffs twice daily  Rinse mouth after use  MDI technique reviewed with the patient  Continue with ProAir MDI 2 puffs 4 times daily as needed  Vaccinations up-to-date  Would need PFTs will order next visit  Follow-up in 6 months or p r n  earlier as needed  Return in about 6 months (around 1/29/2020)  All questions are answered to the patient's satisfaction and understanding  She verbalizes understanding  She is encouraged to call with any further questions or concerns  Portions of the record may have been created with voice recognition software  Occasional wrong word or "sound a like" substitutions may have occurred due to the inherent limitations of voice recognition software  Read the chart carefully and recognize, using context, where substitutions have occurred  Electronically Signed by Yaz Kline MD    ______________________________________________________________________    Chief Complaint: No chief complaint on file        Patient ID: Fredrick Deras is a 76 y o  y o  female has a past medical history of Allergic rhinitis, Asthma, Carpal tunnel syndrome, Deviated septum, Disc degeneration, lumbar, Generalized osteoarthritis of hand, Hepatitis, History of closed fracture, History of malignant neoplasm of thyroid, Hypertension, Hypothyroidism, Nontoxic goiter, Obesity, HEIDI (obstructive sleep apnea), and Thyroid cancer ([facility] [address] )     7/29/2019  Patient presents today for follow-up visit  Ezequiel Evans is here for follow-up of her obstructive sleep apnea and asthma  primary symptoms   Associated symptoms include coughing, headaches and a sore throat  Pertinent negatives include no abdominal pain, arthralgias, chest pain, chills, congestion, diaphoresis, fatigue, fever, myalgias, neck pain, rash, vomiting or weakness  Review of Systems   Constitutional: Negative for appetite change, chills, diaphoresis, fatigue, fever and unexpected weight change  HENT: Positive for postnasal drip, rhinorrhea, sneezing and sore throat  Negative for congestion, ear discharge, ear pain, nosebleeds, sinus pain, trouble swallowing and voice change  Eyes: Negative for pain, discharge and visual disturbance  Respiratory: Positive for cough and wheezing  Negative for apnea, choking, chest tightness, shortness of breath and stridor  Cardiovascular: Negative for chest pain, palpitations and leg swelling  Gastrointestinal: Negative for abdominal pain, blood in stool, constipation, diarrhea and vomiting  Endocrine: Negative for cold intolerance, heat intolerance, polydipsia, polyphagia and polyuria  Genitourinary: Negative for difficulty urinating and dysuria  Musculoskeletal: Negative for arthralgias, myalgias and neck pain  Skin: Negative for pallor and rash  Allergic/Immunologic: Negative for environmental allergies and food allergies  Neurological: Positive for headaches  Negative for dizziness, speech difficulty, weakness and light-headedness  Hematological: Negative for adenopathy   Does not bruise/bleed easily  Psychiatric/Behavioral: Negative for agitation, confusion and sleep disturbance  The patient is not nervous/anxious  Smoking history: She reports that she has never smoked  She has never used smokeless tobacco     The following portions of the patient's history were reviewed and updated as appropriate: allergies, current medications, past family history, past medical history, past social history, past surgical history and problem list     Immunization History   Administered Date(s) Administered    INFLUENZA 12/06/2006, 10/30/2007, 10/29/2008, 10/21/2009, 10/18/2010, 01/05/2019    Influenza Split High Dose Preservative Free IM 10/29/2015, 01/04/2019    Influenza TIV (IM) 11/11/2003, 12/21/2005, 01/09/2017    Pneumococcal Polysaccharide PPV23 11/11/2003, 12/01/2010, 12/01/2010    Tdap 1950     Current Outpatient Medications   Medication Sig Dispense Refill    albuterol (PROAIR HFA) 90 mcg/act inhaler Inhale 2 puffs every 6 (six) hours as needed for wheezing 1 Inhaler 3    escitalopram (LEXAPRO) 20 mg tablet TAKE 1 TABLET BY MOUTH EVERY DAY 90 tablet 1    fluticasone (FLONASE) 50 mcg/act nasal spray 1 spray into each nostril 2 (two) times a day 1 Bottle 3    ibuprofen (MOTRIN) 400 mg tablet Take 400 mg by mouth daily as needed        levothyroxine 150 mcg tablet TAKE 1 TABLET DAILY  (Patient taking differently: TAKE 1 TABLET DAILY  MON/FRI) 90 tablet 1    levothyroxine 175 mcg tablet Take 1 tablet (175 mcg total) by mouth daily TAKE 1 TABLET Saturday AND Sunday  90 tablet 2    LORazepam (ATIVAN) 1 mg tablet Take 1 tablet (1 mg total) by mouth every 8 (eight) hours as needed for anxiety 10 tablet 0    metoprolol succinate (TOPROL-XL) 50 mg 24 hr tablet TAKE 1 TABLET DAILY  90 tablet 3    mometasone-formoterol (DULERA) 200-5 MCG/ACT inhaler Inhale 2 puffs 2 (two) times a day Rinse mouth after use   39 Inhaler 3    pantoprazole (PROTONIX) 40 mg tablet TAKE 1 TABLET EVERY DAY 30 tablet 5    simvastatin (ZOCOR) 20 mg tablet TAKE 1 TABLET EVERY DAY 90 tablet 2    valsartan (DIOVAN) 320 MG tablet TAKE 1 TABLET EVERY DAY 90 tablet 1    Blood Glucose Monitoring Suppl (ONE TOUCH ULTRA 2) w/Device KIT Patient to test two times daily 1 each 0    glucose blood (ONE TOUCH ULTRA TEST) test strip Patient to test two times daily 200 each 3    Lancets (ONETOUCH ULTRASOFT) lancets Patient to test two times daily 200 each 3     No current facility-administered medications for this visit  Allergies: Grapefruit extract; Lisinopril; Loop diuretics; and Pamabrom    Objective:  Vitals:    07/29/19 1110   BP: 130/90   Pulse: 62   SpO2: 95%   Weight: 121 kg (267 lb)   Height: 5' 7 5" (1 715 m)   Oxygen Therapy  SpO2: 95 %    Wt Readings from Last 3 Encounters:   07/29/19 121 kg (267 lb)   06/12/19 120 kg (264 lb)   05/22/19 122 kg (269 lb 6 4 oz)     Body mass index is 41 2 kg/m²  Physical Exam   Constitutional: She is oriented to person, place, and time  She appears well-developed and well-nourished  HENT:   Head: Normocephalic and atraumatic  Crowded oropharyngeal airways, Mallampati score 3   Eyes: Pupils are equal, round, and reactive to light  EOM are normal    Neck: Normal range of motion  Neck supple  Short and wide neck   Cardiovascular: Normal rate, regular rhythm and normal heart sounds  Pulmonary/Chest: Effort normal and breath sounds normal    Abdominal: Soft  Bowel sounds are normal    Musculoskeletal: Normal range of motion  Neurological: She is alert and oriented to person, place, and time  Skin: Skin is warm and dry  Psychiatric: She has a normal mood and affect   Her behavior is normal          Answers for HPI/ROS submitted by the patient on 7/29/2019   Primary symptoms  Do you experience frequent throat clearing?: Yes  Do you have a hoarse voice?: Yes  When did you first notice your symptoms?: more than 1 year ago  How often do your symptoms occur?: every several days  Since you first noticed this problem, how has it changed?: unchanged  Do you have shortness of breath that occurs with effort or exertion?: Yes  Do you have ear congestion?: Yes  Do you have heartburn?: No  Do you have fatigue?: Yes  Do you have nasal congestion?: Yes  Do you have shortness of breath when lying flat?: No  Do you have shortness of breath when you wake up?: No  Do you have sweats?: Yes  Have you experienced weight loss?: No  Which of the following makes your symptoms worse?: change in weather, exposure to fumes, exposure to smoke, strenuous activity  Which of the following makes your symptoms better?: steroid inhaler  Risk factors for lung disease: animal exposure

## 2019-08-15 NOTE — PROGRESS NOTES
Neuropsychological Evaluation  [facility]  [address], [address] [place], [address]  P: [phone] F: [phone]    Feedback from Neuropsychological Evaluation    Ms Eli Andrade returned for a feedback appointment to review the findings and recommendations from a neuropsychological evaluation conducted on 7/1/2019  Findings from the evaluation, including broadly normal range scores with some indications of weak visual learning and memory, were discussed and the patient expressed understanding  Recommendations were reviewed (see evaluation report from 7/1/2019 for full details)  The patient was given the opportunity to ask questions and expressed satisfaction with answers provided  Contact information for this provider was given to the patient and she was encouraged to contact the office with any further questions or concerns  The neuropsychological evaluation report was routed to the referring provider, Dr Diann Barton, for review and follow-up as needed  Sonya Henson PsyD  Neuropsychologist  [place] Licensed Psychologist  Lic  #[id number]     Tasks Conducted Total Time Spent Associated CPT Codes   Clinical Interview 51 min  51827 x 1 unit   96121 x 1 units   Report Writing 88 min  Neuropsychological Test Administration 104 min 96136 x 1 unit  96137 x 3 units   Scoring 24 min        Chart Review 21 min  49203 x 1 unit   Interpretation of Test Data 14 min  Feedback to Patient/Family Members 34 min

## 2019-08-15 NOTE — PROGRESS NOTES
Neuropsychological Evaluation  [facility]  [address], [address], [address]  P: [phone] F: [phone]     Patient Name: Kennedy Bentley   Age: 76 y o  MRN: [id number] : 1950 DOS: 2019    Results/Interpretation    Sources of Information:   [employer] (ACS): Test of Premorbid Functioning (TOPF)  Animal Naming Fluency  Brief Visuospatial Memory Test - Revised (BVMT-R) - Form 1  Clinical Interview (see interview note below for history and presenting problems)  Controlled Oral Word Association Test (COWAT)  Dot Counting Test (DCT)  Generalized Anxiety Disorder - 7 Item (JAMIE-7)  Neuropsychological Assessment Battery (NAB) - Form 1   Selected Subtests: Naming  Patient Health Questionnaire-9 (PHQ-9)  Repeatable Battery for the Assessment of Neuropsychological Status (R-BANS) - Form A   Selected Subtests: Line Orientation  Kin Auditory Verbal Learning Test (RAVLT)  Kin Complex Figure Test and Recognition Trial (RCFT) - Copy Trial  Stroop Color and Word Test (SCWT)  Symbol Digit Modality Test (SDMT)  Trail Making Test (TMT)  Wechsler Abbreviated Scale of Intelligence, Second Edition (WASI-II)   Selected Subtests: Matrix Reasoning, Vocabulary  Wechsler Adult Intelligence Scale, Fourth Edition (WAIS-IV):  Selected Subtests: Digit Span  Wechsler Memory Scale, Fourth Edition (WMS-IV):   Selected Subtests: Logical Memory I/II/Recognition, Symbol Span  [employer] - 64 Card Version (WCST-64)    Evaluation Findings:  Findings, as documented below, are presented with qualitative descriptors (adapted from the Wechsler system) used to aid in the interpretation of results  For ease of readability, findings are categorized by cognitive domain with brief descriptions of the abilities targeted by tasks administered  Engagement/Symptom Validity:   Hearing/Seeing stimuli: No problems reported;  Patient wore glasses throughout the evaluation  Approach to testing:  Cooperative, Appeared to be engaged in tasks administered  Fatigue: None noted; Breaks taken as needed  Formal assessment of symptom validity: No indications of suboptimal effort  Interpretation: Findings thought to be reliable and valid    Premorbid/General Intellectual Functioning:   Estimate in relation to demographic factors: Average  Estimate per word reading test: Average  IQ Estimate: High average  Interpretation: Estimated broadly average to high average range abilities  *The presence of some lower than expected scores on neuropsychological measures does not necessarily suggest decline from baseline cognitive functioning  *    Attention:   Simple auditory attention: Average  Auditory attention/working memory: Average  Visual attention/working memory: Average    Information Processing Speed:  Speeded visual scanning/sequencing: Average  Timed word reading: Average  Timed color identification: Average  Timed number-symbol matching:  Written Responses: Average  Spoken Responses: Average    Language:   Confrontation picture naming: High average  Word knowledge/Verbal concept formation: High average  Semantic fluency: High average  Phonemic fluency: Average    Visual Perception:   Spatial Orientation: Average  Perceptuo-motor abilities: Broadly WNL    Memory and Learning:   Verbal:  List learning: Average overall;  Indications of benefit from repeated exposure to stimuli  List recall: Short delay: Low average; 21st percentile (7/15 words recalled)  List recall: Long delay: Low average; 19th percentile (6/15 words recalled)  List recognition: Average recognition; 13/15 recognition hits; 0 false positive errors  Story learning: High average  Story recall: High average  Story recognition: WNL (Base rate >75%)  Visual:  Simple shape learning: Borderline overall; Limited indication of benefit from repeated exposure to stimuli  Simple shape recall: Borderline  Simple shape recognition: Average range discriminability score; 6/6 recognition hits; 0 false positive errors    Executive Functions:    Visual organization/reasoning: High average  Visual-motor sequencing/set-shifting: Average  Phonemic fluency: Average  Response inhibition/selective attention: Low average  Concept formation/problem solving: Number of completed trials was WNL; Total error score was within the average range    Rating Scales:    PHQ-9: Self-ratings of symptoms were consistent with a minimal degree of depression  JAMIE-7: Self-ratings of symptoms were consistent with a normal degree of anxiety    Summary and Interpretation:  Ms Rianna Shin is a 76 y o , right handed, female with 14 years of education who was referred for neuropsychological evaluation by her neurology provider, Gris Deluca MD, for assessment of cognitive functioning in the context of a personal history that includes thyroid CA, lumbar disc degeneration, OA (hand), HTN, HLD, hyperglycemia, and HEIDI  At the time of the present evaluation, the patient reported minimal concerns regarding her cognitive functioning, but stated that her daughter has been expressing concerns about her perception of memory changes over the past two years  The patient acknowledged some potential cognitive changes secondary to affective distress as a result of her  becoming ill 6 years ago and eventually passing away in April 2019  She reported that she has been managing her emotional health the best [she] can in this regard, but acknowledged that she may be more distractible and less focused as a result of these factors  In terms of day-to-day functioning, she is generally independent  Neuropsychological testing was completed and findings were thought to be a valid reflection of the patients current cognitive abilities  Premorbid estimates suggest broadly average to high average range functioning   Performance was within a normal, typically expected range relative to the normative sample and estimated premorbid abilities across most cognitive skill areas assessed, including simple and complex attention, basic language abilities, visual perception and construction, information processing speed, and several aspects of executive functioning  Her profile of performance on measures of learning and memory were notable for indications of weak visual learning with resultantly poor retrieval for visual information  Her performance was notable for intact recognition memory for visual information, which may suggest a retrieval rather than retention deficit  Emotionally, there were no indications of clinically relevant degrees of depressive or anxious symptomology  In summary, Ms Morales Camacho can be reassured that her current neuropsychological profile is largely within normal limits with the exception of some indication of possible visual learning and memory difficulties  Findings are viewed as being somewhat atypical relative to what may be expected of cognitive changes due to affective distress, but situations stressors should remain a potential contributing factor  Additional consideration should be given to possible vascular contributions given the patients history of cerebrovascular risk factors (e g , HTN, HLD, possible DM, and HEIDI) and indications of chronic microvascular changes on imaging, as well as potential cognitive effects of chronic pain and some sleep difficulties  Should she or her family members continue to have concerns regarding worsening cognitive functioning, repeat neuropsychological evaluation can be conducted no sooner than one year from the time of the present evaluation (July 2020)  Recommendations:  1  The patient is encouraged to work closely with her cardiology provider(s) for ongoing management of vascular risk factors to minimize the risk of further cognitive decline due to cerebrovascular problems      2  Given the potential cognitive impact of psychological factors that include anxious and depressive symptomology, the patient is encouraged to continue her current psychotropic medication regimen  If she finds this treatment to be insufficient for symptom reduction in the future, she may benefit from consultation with a psychiatrist to discuss alterations to her medication regimen that may improve her symptoms  3  Similarly, the patient is encouraged to consider exploring grief support resources (online and/or in person) as desired and as she finds beneficial  She may additionally consider individual mental health counseling with a focus on working through grief  4  If the patient experiences cognitive inefficiencies in her day-to-day life, she may benefit from incorporating the following compensatory memory strategies into her routine practices  · Write information down, rather than relying upon your memory alone (e g , use a notepad by the phone, Post-It notes, or a dry erase board set up in a central location in the home)     · Set alarms, timers, or reminders for important events or repeating occurrences on a cell phone, watch, or other electronic device (e g , alarm to remember to take medications, reminder for daily tasks, create an event on your electronic calendar to remember birthdays/appointments)  · It can be helpful to set up a central location where items used on a daily basis (e g , keys, wallet) are always placed  · Repetition can be helpful in forming new memories  Recite important information several times to encourage retention of information  · There is some research that suggests potential cognitive benefit from regular engagement in cognitively stimulating activities  The patient may wish to consider participating in conventional [address] such as sudoku puzzles, word searches, cross-word puzzles, or other activities available in puzzle books or on websites (such as [employer] or [employer])   Additionally, other, novel activities that require cognitive flexibility and new skill building (e g , learning a new language, learning to play an instrument) may also provide mental stimulation  5  Regular physical activity can have a significant impact on physical, emotional, and cognitive health  The patient is encouraged to incorporate aspects of cardiovascular and strength-building exercise into her life  Discussion of current medical status with health care providers is strongly encouraged before starting any new exercise regimen  6  Continuation of neurological care is recommended to continue monitoring any changes in Ms  Mauros cognitive symptoms  Dayami Huff PsyD  Neuropsychologist  [place] Licensed Psychologist  Lic  #[id number]

## 2019-08-16 ENCOUNTER — TELEPHONE (OUTPATIENT)
Dept: PULMONOLOGY | Facility: CLINIC | Age: 69
End: 2019-08-16

## 2019-08-20 ENCOUNTER — OFFICE VISIT (OUTPATIENT)
Dept: INTERNAL MEDICINE CLINIC | Facility: CLINIC | Age: 69
End: 2019-08-20
Payer: COMMERCIAL

## 2019-08-20 VITALS
WEIGHT: 264.6 LBS | OXYGEN SATURATION: 95 % | RESPIRATION RATE: 16 BRPM | BODY MASS INDEX: 41.53 KG/M2 | DIASTOLIC BLOOD PRESSURE: 82 MMHG | SYSTOLIC BLOOD PRESSURE: 138 MMHG | HEIGHT: 67 IN | HEART RATE: 64 BPM

## 2019-08-20 DIAGNOSIS — M81.0 POSTMENOPAUSAL BONE LOSS: ICD-10-CM

## 2019-08-20 DIAGNOSIS — E78.5 HYPERLIPIDEMIA, UNSPECIFIED HYPERLIPIDEMIA TYPE: ICD-10-CM

## 2019-08-20 DIAGNOSIS — F32.A DEPRESSION, UNSPECIFIED DEPRESSION TYPE: ICD-10-CM

## 2019-08-20 DIAGNOSIS — C73 THYROID CANCER (HCC): ICD-10-CM

## 2019-08-20 DIAGNOSIS — E66.9 OBESITY WITHOUT SERIOUS COMORBIDITY, UNSPECIFIED CLASSIFICATION, UNSPECIFIED OBESITY TYPE: ICD-10-CM

## 2019-08-20 DIAGNOSIS — I10 ESSENTIAL HYPERTENSION: ICD-10-CM

## 2019-08-20 DIAGNOSIS — E11.9 TYPE 2 DIABETES MELLITUS WITHOUT COMPLICATION, WITHOUT LONG-TERM CURRENT USE OF INSULIN (HCC): Primary | ICD-10-CM

## 2019-08-20 DIAGNOSIS — Z11.59 NEED FOR HEPATITIS C SCREENING TEST: ICD-10-CM

## 2019-08-20 PROCEDURE — 1036F TOBACCO NON-USER: CPT | Performed by: INTERNAL MEDICINE

## 2019-08-20 PROCEDURE — 1170F FXNL STATUS ASSESSED: CPT | Performed by: INTERNAL MEDICINE

## 2019-08-20 PROCEDURE — 3075F SYST BP GE 130 - 139MM HG: CPT | Performed by: INTERNAL MEDICINE

## 2019-08-20 PROCEDURE — 1125F AMNT PAIN NOTED PAIN PRSNT: CPT | Performed by: INTERNAL MEDICINE

## 2019-08-20 PROCEDURE — 99214 OFFICE O/P EST MOD 30 MIN: CPT | Performed by: INTERNAL MEDICINE

## 2019-08-20 PROCEDURE — G0439 PPPS, SUBSEQ VISIT: HCPCS | Performed by: INTERNAL MEDICINE

## 2019-08-20 NOTE — PROGRESS NOTES
Assessment/Plan:  Regarding blood sugars and thyroid this will be man managed by her endocrinologist     Blood pressure is under control with no cardiac complaints  Has hyperlipidemia and will check cholesterol with next labs in October  I will see her back in 4 months  In the meantime she will get a DEXA scan  She will also get a hepatitis C antibody    No diagnosis found  No orders of the defined types were placed in this encounter  Subjective:  She is in for follow-up  Her problems are 1  Diabetes 2  Hypothyroid 3  History of thyroid cancer 4  Hypertension 5  Hyperlipidemia 6  Morbid obesity     Patient ID: Quinn Mistry is a 76 y o  female  HPI still has some grieving for the loss of her  who  of cancer  Otherwise holding up fairly well  Remains morbidly obese but is losing weight  Weighs 264 lb  Blood pressures were under control  She is followed by the endocrinologist who put her on metformin for her blood sugars  Also has a history of thyroid cancer  The following portions of the patient's history were reviewed and updated as appropriate:   She has a past medical history of Allergic rhinitis, Asthma, Carpal tunnel syndrome, Deviated septum, Disc degeneration, lumbar, Generalized osteoarthritis of hand, Hepatitis, History of closed fracture, History of malignant neoplasm of thyroid, Hypertension, Hypothyroidism, Nontoxic goiter, Obesity, HEIDI (obstructive sleep apnea), and Thyroid cancer ([facility] [address] )  ,  does not have any pertinent problems on file  ,   has a past surgical history that includes Popliteal synovial cyst excision; Thyroid surgery; Tonsillectomy; and Knee surgery  ,  family history includes Alcohol abuse in her father; Arrhythmia in her mother; Cirrhosis in her family and father; Heart attack in her mother; Heart disease in her brother; Hypertension in her brother  ,   reports that she has never smoked   She has never used smokeless tobacco  She reports that she drinks alcohol  She reports that she does not use drugs  ,  is allergic to grapefruit extract; lisinopril; loop diuretics; and pamabrom       Current Outpatient Medications:     albuterol (PROAIR HFA) 90 mcg/act inhaler, Inhale 2 puffs every 6 (six) hours as needed for wheezing, Disp: 1 Inhaler, Rfl: 3    Blood Glucose Monitoring Suppl (ONE TOUCH ULTRA 2) w/Device KIT, Patient to test two times daily, Disp: 1 each, Rfl: 0    escitalopram (LEXAPRO) 20 mg tablet, TAKE 1 TABLET BY MOUTH EVERY DAY, Disp: 90 tablet, Rfl: 1    fluticasone (FLONASE) 50 mcg/act nasal spray, 1 spray into each nostril 2 (two) times a day, Disp: 1 Bottle, Rfl: 3    glucose blood (ONE TOUCH ULTRA TEST) test strip, Patient to test two times daily, Disp: 200 each, Rfl: 3    ibuprofen (MOTRIN) 400 mg tablet, Take 400 mg by mouth daily as needed  , Disp: , Rfl:     Lancets (ONETOUCH ULTRASOFT) lancets, Patient to test two times daily, Disp: 200 each, Rfl: 3    levothyroxine 150 mcg tablet, TAKE 1 TABLET DAILY  (Patient taking differently: TAKE 1 TABLET DAILY  MON/FRI), Disp: 90 tablet, Rfl: 1    levothyroxine 175 mcg tablet, Take 1 tablet (175 mcg total) by mouth daily TAKE 1 TABLET Saturday AND Sunday  , Disp: 90 tablet, Rfl: 2    LORazepam (ATIVAN) 1 mg tablet, Take 1 tablet (1 mg total) by mouth every 8 (eight) hours as needed for anxiety, Disp: 10 tablet, Rfl: 0    metFORMIN (GLUCOPHAGE) 500 mg tablet, Take 500 mg by mouth daily, Disp: , Rfl:     metoprolol succinate (TOPROL-XL) 50 mg 24 hr tablet, TAKE 1 TABLET DAILY  , Disp: 90 tablet, Rfl: 3    mometasone-formoterol (DULERA) 200-5 MCG/ACT inhaler, Inhale 2 puffs 2 (two) times a day Rinse mouth after use , Disp: 39 Inhaler, Rfl: 3    pantoprazole (PROTONIX) 40 mg tablet, TAKE 1 TABLET EVERY DAY, Disp: 30 tablet, Rfl: 5    simvastatin (ZOCOR) 20 mg tablet, TAKE 1 TABLET EVERY DAY, Disp: 90 tablet, Rfl: 2    valsartan (DIOVAN) 320 MG tablet, TAKE 1 TABLET EVERY DAY, Disp: 90 tablet, Rfl: 1    Review of Systems   Constitutional: Negative for activity change, appetite change, chills, diaphoresis, fatigue, fever and unexpected weight change  HENT: Negative for congestion, ear pain, hearing loss, mouth sores, nosebleeds, postnasal drip, sinus pressure, sinus pain, sore throat and trouble swallowing  Eyes: Negative for pain, discharge and visual disturbance  Respiratory: Negative for apnea, cough, chest tightness, shortness of breath and wheezing  Cardiovascular: Negative for chest pain, palpitations and leg swelling  Gastrointestinal: Negative for abdominal pain, anal bleeding, blood in stool, constipation, diarrhea, nausea and vomiting  Endocrine: Negative for polydipsia and polyphagia  Positive history of thyroid cancer and diabetes   Genitourinary: Negative for decreased urine volume, dysuria, flank pain, frequency, hematuria and urgency  Musculoskeletal: Negative for arthralgias, back pain, gait problem, joint swelling and myalgias  Skin: Negative for rash and wound  Allergic/Immunologic: Negative for environmental allergies and food allergies  Neurological: Negative for dizziness, tremors, seizures, syncope, speech difficulty, light-headedness, numbness and headaches  Hematological: Negative for adenopathy  Does not bruise/bleed easily  Psychiatric/Behavioral: Positive for dysphoric mood  Negative for agitation, confusion, hallucinations, sleep disturbance and suicidal ideas  The patient is not nervous/anxious  Depression is under fairly good control  Objective:  /82 (BP Location: Left arm, Patient Position: Sitting, Cuff Size: Standard)   Pulse 64   Resp 16   Ht 5' 7" (1 702 m)   Wt 120 kg (264 lb 9 6 oz)   SpO2 95%   BMI 41 44 kg/m²      Physical Exam   Constitutional: She appears well-developed  No distress  Markedly overweight  Weighs 264 lb  Blood pressure is 126/76  Pulse is 64  O2 saturations 95      HENT:   Head: Normocephalic  Right Ear: External ear normal    Left Ear: External ear normal    Nose: Nose normal    Mouth/Throat: Oropharynx is clear and moist  No oropharyngeal exudate  Eyes: Pupils are equal, round, and reactive to light  Conjunctivae and EOM are normal  Right eye exhibits no discharge  Left eye exhibits no discharge  Neck: Normal range of motion  Neck supple  No thyromegaly present  Cardiovascular: Normal rate, regular rhythm and intact distal pulses  Exam reveals no gallop and no friction rub  No murmur heard  Heart tones are distant  Pulmonary/Chest: Effort normal and breath sounds normal  No respiratory distress  She has no wheezes  She has no rales  Abdominal: Soft  Bowel sounds are normal  She exhibits no distension and no mass  There is no tenderness  There is no rebound and no guarding  Markedly overweight  Musculoskeletal: Normal range of motion  She exhibits no edema, tenderness or deformity  Arthritic changes noted about her knees  Lymphadenopathy:     She has no cervical adenopathy  Neurological: She is alert  She has normal reflexes  She displays normal reflexes  No cranial nerve deficit  Coordination normal    Skin: Skin is warm and dry  No rash noted  No erythema  Psychiatric: She has a normal mood and affect  Her behavior is normal  Judgment and thought content normal    Nursing note and vitals reviewed  No results found for this or any previous visit (from the past 1008 hour(s))

## 2019-08-20 NOTE — PROGRESS NOTES
paatient if for wellness visit  Questions reviewed       Problem List Items Addressed This Visit     None         History of Present Illness:     Patient presents for Medicare Annual Wellness visit    Patient Care Team:  Tahira Sanz DO as PCP - MD Cathleen Higgins MD as Consulting Physician (Neurology)     Problem List:     Patient Active Problem List   Diagnosis    Asthma    Depression    GERD without esophagitis    Hyperlipidemia    Hypertension    Obesity    Thyroid cancer ([facility] [address]    Memory loss    Memory difficulty    Balance problem    Numbness and tingling    Type 2 diabetes mellitus without complication ([facility] )      Past Medical and Surgical History:     Past Medical History:   Diagnosis Date    Allergic rhinitis     Asthma     Carpal tunnel syndrome     Deviated septum     Disc degeneration, lumbar     Generalized osteoarthritis of hand     Hepatitis     History of closed fracture     bone    History of malignant neoplasm of thyroid     Hypertension     Hypothyroidism     Nontoxic goiter     Obesity     HEIDI (obstructive sleep apnea)     Thyroid cancer (HCC)      Past Surgical History:   Procedure Laterality Date    KNEE SURGERY      POPLITEAL SYNOVIAL CYST EXCISION      THYROID SURGERY      TONSILLECTOMY        Family History:     Family History   Problem Relation Age of Onset    Arrhythmia Mother         Myocardial Infarction Arrhythmias    Heart attack Mother     Alcohol abuse Father     Cirrhosis Father     Cirrhosis Family         hepatic    Heart disease Brother     Hypertension Brother       Social History:     Social History     Tobacco Use   Smoking Status Never Smoker   Smokeless Tobacco Never Used     Social History     Substance and Sexual Activity   Alcohol Use No     Social History     Substance and Sexual Activity   Drug Use No      Medications and Allergies:     Current Outpatient Medications   Medication Sig Dispense Refill    albuterol (PROAIR HFA) 90 mcg/act inhaler Inhale 2 puffs every 6 (six) hours as needed for wheezing 1 Inhaler 3    Blood Glucose Monitoring Suppl (ONE TOUCH ULTRA 2) w/Device KIT Patient to test two times daily 1 each 0    escitalopram (LEXAPRO) 20 mg tablet TAKE 1 TABLET BY MOUTH EVERY DAY 90 tablet 1    fluticasone (FLONASE) 50 mcg/act nasal spray 1 spray into each nostril 2 (two) times a day 1 Bottle 3    glucose blood (ONE TOUCH ULTRA TEST) test strip Patient to test two times daily 200 each 3    ibuprofen (MOTRIN) 400 mg tablet Take 400 mg by mouth daily as needed        Lancets (ONETOUCH ULTRASOFT) lancets Patient to test two times daily 200 each 3    levothyroxine 150 mcg tablet TAKE 1 TABLET DAILY  (Patient taking differently: TAKE 1 TABLET DAILY  MON/FRI) 90 tablet 1    levothyroxine 175 mcg tablet Take 1 tablet (175 mcg total) by mouth daily TAKE 1 TABLET Saturday AND Sunday  90 tablet 2    LORazepam (ATIVAN) 1 mg tablet Take 1 tablet (1 mg total) by mouth every 8 (eight) hours as needed for anxiety 10 tablet 0    metFORMIN (GLUCOPHAGE) 500 mg tablet Take 500 mg by mouth daily      metoprolol succinate (TOPROL-XL) 50 mg 24 hr tablet TAKE 1 TABLET DAILY  90 tablet 3    mometasone-formoterol (DULERA) 200-5 MCG/ACT inhaler Inhale 2 puffs 2 (two) times a day Rinse mouth after use  39 Inhaler 3    pantoprazole (PROTONIX) 40 mg tablet TAKE 1 TABLET EVERY DAY 30 tablet 5    simvastatin (ZOCOR) 20 mg tablet TAKE 1 TABLET EVERY DAY 90 tablet 2    valsartan (DIOVAN) 320 MG tablet TAKE 1 TABLET EVERY DAY 90 tablet 1     No current facility-administered medications for this visit        Allergies   Allergen Reactions    Grapefruit Extract Other (See Comments)     Oral sores    Lisinopril      ACEI cough    Loop Diuretics     Pamabrom Palpitations     All Diuretics - Hallucination      Immunizations:     Immunization History   Administered Date(s) Administered    INFLUENZA 12/06/2006, 10/30/2007, 10/29/2008, 10/21/2009, 10/18/2010, 2019    Influenza Split High Dose Preservative Free IM 10/29/2015, 2019    Influenza TIV (IM) 2003, 2005, 2017    Pneumococcal Polysaccharide PPV23 2003, 2010, 2010    Tdap 1950      Medicare Screening Tests and Risk Assessments:     Gabriela Kowalski is here for her Initial Wellness visit  Health Risk Assessment:  Patient rates overall health as good  Patient feels that their physical health rating is Slightly better  Eyesight was rated as Same  Hearing was rated as Slightly worse  Patient feels that their emotional and mental health rating is Slightly better  Pain experienced by patient in the last 7 days has been Some  Patient's pain rating has been 4/10  Patient states that she has experienced weight loss or gain in last 6 months  Emotional/Mental Health:  Patient has been feeling nervous/anxious  PHQ-9 Depression Screening:    Frequency of the following problems over the past two weeks:      1  Little interest or pleasure in doing things: 3 - nearly every day      2  Feeling down, depressed, or hopeless: 3 - nearly every day      3  Trouble falling or staying asleep, or sleeping too much: 3 - nearly every day      4  Feeling tired or having little energy: 1 - several days      5  Poor appetite or overeatin - not at all      6  Feeling bad about yourself - or that you are a failure or have let yourself or your family down: 0 - not at all      7  Trouble concentrating on things, such as reading the newspaper or watching television: 0 - not at all      8  Moving or speaking so slowly that other people could have noticed  Or the opposite - being so fidgety or restless that you have been moving around a lot more than usual: 0 - not at all      9  Thoughts that you would be better off dead, or of hurting yourself in some way: 0 - not at all  PHQ-2 Score: 6  PHQ-9 Score: 10    Broken Bones/Falls:     Fall Risk Assessment:    In the past year, patient has experienced: No history of falling in past year          Bladder/Bowel:  Patient has leaked urine accidently in the last six months  Patient reports no loss of bowel control  Immunizations:  Patient has had a flu vaccination within the last year  Patient has not received a pneumonia shot  Patient has not received a shingles shot  Home Safety:  Patient has trouble with stairs inside or outside of their home  Patient currently reports that there are no safety hazards present in home, no working smoke alarms, no working carbon monoxide detectors  Preventative Screenings:   Breast cancer screening performed, colon cancer screen completed, cholesterol screen completed, no glaucoma eye exam completed    Nutrition:  Current diet: Diabetic and Limited junk food with servings of the following:    Medications:  Patient is not currently taking any over-the-counter supplements  Patient is able to manage medications  Lifestyle Choices:  Patient reports no tobacco use  Patient has not smoked or used tobacco in the past   Patient reports alcohol use  Patient drives a vehicle  Patient wears seat belt  Activities of Daily Living:  Can get out of bed by his or her self, able to dress self, able to make own meals, able to do own shopping, able to bathe self, can do own laundry/housekeeping, can manage own money, pay bills and track expenses    Previous Hospitalizations:  No hospitalization or ED visit in past 12 months        Advanced Directives:  Patient has not decided on power of   Patient has not completed advanced directive          Preventative Screening/Counseling:      Cardiovascular:      General: Risks and Benefits Discussed and Screening Current          Diabetes:      General: Risks and Benefits Discussed and Screening Current          Colorectal Cancer:      General: Risks and Benefits Discussed and Screening Current          Breast Cancer:      General: Risks and Benefits Discussed and Screening Current          Cervical Cancer:      General: Risks and Benefits Discussed and Screening Current          Osteoporosis:      General: Risks and Benefits Discussed      Due for studies: DXA Axial and DXA Appendicular          AAA:      General: Screening Not Indicated          Glaucoma:      General: Risks and Benefits Discussed and Screening Current      Referrals: Ophthalmology          HIV:      General: Screening Not Indicated and Risks and Benefits Discussed          Hepatitis C:      General: Risks and Benefits Discussed and Screening Current      Counseling: has received general HCV counseling        Advanced Directives:   Patient has no living will for healthcare, does not have durable POA for healthcare,

## 2019-08-22 ENCOUNTER — TELEPHONE (OUTPATIENT)
Dept: PULMONOLOGY | Facility: CLINIC | Age: 69
End: 2019-08-22

## 2019-08-29 DIAGNOSIS — K21.9 GASTROESOPHAGEAL REFLUX DISEASE WITHOUT ESOPHAGITIS: ICD-10-CM

## 2019-08-29 DIAGNOSIS — I10 ESSENTIAL HYPERTENSION: ICD-10-CM

## 2019-08-29 PROCEDURE — 4010F ACE/ARB THERAPY RXD/TAKEN: CPT | Performed by: INTERNAL MEDICINE

## 2019-08-29 RX ORDER — VALSARTAN 320 MG/1
TABLET ORAL
Qty: 90 TABLET | Refills: 1 | Status: SHIPPED | OUTPATIENT
Start: 2019-08-29 | End: 2020-04-06

## 2019-08-29 RX ORDER — PANTOPRAZOLE SODIUM 40 MG/1
TABLET, DELAYED RELEASE ORAL
Qty: 90 TABLET | Refills: 1 | Status: SHIPPED | OUTPATIENT
Start: 2019-08-29 | End: 2020-03-09

## 2020-01-03 ENCOUNTER — OFFICE VISIT (OUTPATIENT)
Dept: INTERNAL MEDICINE CLINIC | Facility: CLINIC | Age: 70
End: 2020-01-03
Payer: COMMERCIAL

## 2020-01-03 VITALS
OXYGEN SATURATION: 96 % | HEART RATE: 73 BPM | DIASTOLIC BLOOD PRESSURE: 88 MMHG | HEIGHT: 67 IN | WEIGHT: 256 LBS | BODY MASS INDEX: 40.18 KG/M2 | SYSTOLIC BLOOD PRESSURE: 168 MMHG

## 2020-01-03 DIAGNOSIS — F32.A DEPRESSION, UNSPECIFIED DEPRESSION TYPE: ICD-10-CM

## 2020-01-03 DIAGNOSIS — Z79.4 TYPE 2 DIABETES MELLITUS WITH HYPERGLYCEMIA, WITH LONG-TERM CURRENT USE OF INSULIN (HCC): ICD-10-CM

## 2020-01-03 DIAGNOSIS — I10 ESSENTIAL HYPERTENSION: ICD-10-CM

## 2020-01-03 DIAGNOSIS — E11.65 TYPE 2 DIABETES MELLITUS WITH HYPERGLYCEMIA, WITH LONG-TERM CURRENT USE OF INSULIN (HCC): ICD-10-CM

## 2020-01-03 DIAGNOSIS — C73 THYROID CANCER (HCC): ICD-10-CM

## 2020-01-03 DIAGNOSIS — K21.9 GERD WITHOUT ESOPHAGITIS: Primary | ICD-10-CM

## 2020-01-03 DIAGNOSIS — J41.0 SIMPLE CHRONIC BRONCHITIS (HCC): ICD-10-CM

## 2020-01-03 DIAGNOSIS — E78.5 HYPERLIPIDEMIA, UNSPECIFIED HYPERLIPIDEMIA TYPE: ICD-10-CM

## 2020-01-03 DIAGNOSIS — E66.01 OBESITY, MORBID (MORE THAN 100 LBS OVER IDEAL WEIGHT OR BMI > 40) (HCC): ICD-10-CM

## 2020-01-03 DIAGNOSIS — E11.9 TYPE 2 DIABETES MELLITUS WITHOUT COMPLICATION, WITHOUT LONG-TERM CURRENT USE OF INSULIN (HCC): ICD-10-CM

## 2020-01-03 PROCEDURE — 3725F SCREEN DEPRESSION PERFORMED: CPT | Performed by: INTERNAL MEDICINE

## 2020-01-03 PROCEDURE — 3008F BODY MASS INDEX DOCD: CPT | Performed by: INTERNAL MEDICINE

## 2020-01-03 PROCEDURE — 99214 OFFICE O/P EST MOD 30 MIN: CPT | Performed by: INTERNAL MEDICINE

## 2020-01-03 PROCEDURE — 1101F PT FALLS ASSESS-DOCD LE1/YR: CPT | Performed by: INTERNAL MEDICINE

## 2020-01-03 PROCEDURE — 3288F FALL RISK ASSESSMENT DOCD: CPT | Performed by: INTERNAL MEDICINE

## 2020-01-03 PROCEDURE — 1160F RVW MEDS BY RX/DR IN RCRD: CPT | Performed by: INTERNAL MEDICINE

## 2020-01-03 NOTE — PROGRESS NOTES
Assessment/Plan:  Regarding her blood pressure it came in quite high  She is going to buy a blood pressure cuff  She will get an over size cuff  She will monitor her blood pressures record them and bring a list along with a blood pressure cuff in 3 or 4 weeks  Blood sugars are reasonably stable at the present time  LDL cholesterol is 101  She is up-to-date on other health the maintenance issues  We need to get her blood pressure under control  Will recheck her in a month  In the meantime she needs to lose weight and restrict salt  1  GERD without esophagitis  Occult Blood, Fecal Immunochemical   2  Type 2 diabetes mellitus without complication, without long-term current use of insulin ([facility] [address] )     3  Essential hypertension     4  Hyperlipidemia, unspecified hyperlipidemia type     5  Depression, unspecified depression type     6  Simple chronic bronchitis ([facility] [address] )     7  Type 2 diabetes mellitus with hyperglycemia, with long-term current use of insulin ([facility])     8  Obesity, morbid (more than 100 lbs over ideal weight or BMI > 40) ([facility])     9  Thyroid cancer ([facility] [address] )         Orders Placed This Encounter   Procedures    Occult Blood, Fecal Immunochemical         Subjective:  She comes in for follow-up with multiple problems as listed  Blood sugar elevations in the form of diabetes as well as hypothyroidism taking care by Endocrine  Blood pressure is elevated today  She has been having bad dreams and is under a fair amount of stress  Patient ID: Jannette Yin is a 71 y o  female      HPI    The following portions of the patient's history were reviewed and updated as appropriate:   She has a past medical history of Allergic rhinitis, Asthma, Carpal tunnel syndrome, Deviated septum, Disc degeneration, lumbar, Generalized osteoarthritis of hand, Hepatitis, History of closed fracture, History of malignant neoplasm of thyroid, Hypertension, Hypothyroidism, Nontoxic goiter, Obesity, HEIDI (obstructive sleep apnea), and Thyroid cancer ([facility] [address] )  ,  does not have any pertinent problems on file  ,   has a past surgical history that includes Popliteal synovial cyst excision; Thyroid surgery; Tonsillectomy; and Knee surgery  ,  family history includes Alcohol abuse in her father; Arrhythmia in her mother; Cirrhosis in her family and father; Heart attack in her mother; Heart disease in her brother; Hypertension in her brother  ,   reports that she has never smoked  She has never used smokeless tobacco  She reports that she drinks alcohol  She reports that she does not use drugs  ,  is allergic to grapefruit extract; lisinopril; loop diuretics; and pamabrom       Current Outpatient Medications:     albuterol (PROAIR HFA) 90 mcg/act inhaler, Inhale 2 puffs every 6 (six) hours as needed for wheezing, Disp: 1 Inhaler, Rfl: 3    Blood Glucose Monitoring Suppl (ONE TOUCH ULTRA 2) w/Device KIT, Patient to test two times daily, Disp: 1 each, Rfl: 0    escitalopram (LEXAPRO) 20 mg tablet, TAKE 1 TABLET BY MOUTH EVERY DAY, Disp: 90 tablet, Rfl: 1    fluticasone (FLONASE) 50 mcg/act nasal spray, 1 spray into each nostril 2 (two) times a day, Disp: 1 Bottle, Rfl: 3    glucose blood (ONE TOUCH ULTRA TEST) test strip, Patient to test two times daily, Disp: 200 each, Rfl: 3    ibuprofen (MOTRIN) 400 mg tablet, Take 400 mg by mouth daily as needed  , Disp: , Rfl:     Lancets (ONETOUCH ULTRASOFT) lancets, Patient to test two times daily, Disp: 200 each, Rfl: 3    levothyroxine 150 mcg tablet, TAKE 1 TABLET DAILY  (Patient taking differently: TAKE 1 TABLET DAILY  MON/FRI), Disp: 90 tablet, Rfl: 1    metFORMIN (GLUCOPHAGE) 500 mg tablet, Take 500 mg by mouth daily, Disp: , Rfl:     metoprolol succinate (TOPROL-XL) 50 mg 24 hr tablet, TAKE 1 TABLET DAILY  , Disp: 90 tablet, Rfl: 3    pantoprazole (PROTONIX) 40 mg tablet, TAKE 1 TABLET EVERY DAY, Disp: 90 tablet, Rfl: 1    simvastatin (ZOCOR) 20 mg tablet, TAKE 1 TABLET EVERY DAY, Disp: 90 tablet, Rfl: 2    valsartan (DIOVAN) 320 MG tablet, TAKE 1 TABLET EVERY DAY, Disp: 90 tablet, Rfl: 1    mometasone-formoterol (DULERA) 200-5 MCG/ACT inhaler, Inhale 2 puffs 2 (two) times a day Rinse mouth after use  (Patient not taking: Reported on 1/3/2020), Disp: 39 Inhaler, Rfl: 3    Review of Systems   Constitutional: Negative for activity change, appetite change, chills, diaphoresis, fatigue, fever and unexpected weight change  HENT: Negative for congestion, ear pain, hearing loss, mouth sores, nosebleeds, postnasal drip, sinus pressure, sinus pain, sore throat and trouble swallowing  Eyes: Negative for pain, discharge and visual disturbance  Respiratory: Negative for apnea, cough, chest tightness, shortness of breath and wheezing  Cardiovascular: Negative for chest pain, palpitations and leg swelling  No exertional chest pain pressure squeezing or tightness  Gastrointestinal: Negative for abdominal pain, anal bleeding, blood in stool, constipation, diarrhea, nausea and vomiting  GERD is under control  Endocrine: Negative for polydipsia and polyphagia  Genitourinary: Negative for decreased urine volume, dysuria, flank pain, frequency, hematuria and urgency  Musculoskeletal: Negative for arthralgias, back pain, gait problem, joint swelling and myalgias  Skin: Negative for rash and wound  Allergic/Immunologic: Negative for environmental allergies and food allergies  Neurological: Negative for dizziness, tremors, seizures, syncope, speech difficulty, light-headedness, numbness and headaches  Hematological: Negative for adenopathy  Does not bruise/bleed easily  Psychiatric/Behavioral: Negative for agitation, confusion, hallucinations, sleep disturbance and suicidal ideas  The patient is not nervous/anxious            Objective:  /88 (BP Location: Left arm, Patient Position: Sitting, Cuff Size: Standard)   Pulse 73   Ht 5' 7" (1 702 m)   Wt 116 kg (256 lb)   SpO2 96%   BMI 40 10 kg/m²      Physical Exam   Constitutional: She appears well-developed  No distress  Obese female in no acute distress  Blood pressure was initially 164/88  It came down to 144/80  Weight is 256 lb  O2 saturation is 96%  HENT:   Head: Normocephalic  Right Ear: External ear normal    Left Ear: External ear normal    Nose: Nose normal    Mouth/Throat: Oropharynx is clear and moist  No oropharyngeal exudate  Eyes: Pupils are equal, round, and reactive to light  Conjunctivae and EOM are normal  Right eye exhibits no discharge  Left eye exhibits no discharge  Neck: Normal range of motion  Neck supple  No thyromegaly present  Cardiovascular: Normal rate, regular rhythm, normal heart sounds and intact distal pulses  Exam reveals no gallop and no friction rub  No murmur heard  Pulmonary/Chest: Effort normal and breath sounds normal  No respiratory distress  She has no wheezes  She has no rales  Abdominal: Soft  Bowel sounds are normal  She exhibits no distension and no mass  There is no tenderness  There is no rebound and no guarding  Abdomen is obese soft nontender with no masses  Musculoskeletal: Normal range of motion  She exhibits no edema, tenderness or deformity  Lymphadenopathy:     She has no cervical adenopathy  Neurological: She is alert  She has normal reflexes  She displays normal reflexes  No cranial nerve deficit  Coordination normal    Skin: Skin is warm and dry  No rash noted  No erythema  Psychiatric: She has a normal mood and affect  Her behavior is normal  Judgment and thought content normal    Nursing note and vitals reviewed  No results found for this or any previous visit (from the past 1008 hour(s))

## 2020-01-06 ENCOUNTER — VBI (OUTPATIENT)
Dept: INTERNAL MEDICINE CLINIC | Facility: CLINIC | Age: 70
End: 2020-01-06

## 2020-01-06 NOTE — LETTER
Diabetic Foot Exam Form    Date Requested: 20  Patient: Frances Guillen  Patient : 1950   Referring Provider: DO Raymundo Archer  [phone] Fax: [phone]  Diabetic Foot Exam Performed        Yes         No     Date of Diabetic Foot Exam ______________________________  Left Foot       Visual Inspection         Monofilament Testing Sensory Exam        Pedal Pulses         Additional Comments         Right Foot      Visual Inspection         Monofilament Testing Sensory Exam       Pedal Pulses         Additional Comments         Comments __________________________________________________________    Practice Providing Exam ______________________________________________    Exam Performed By (print name) _______________________________________      Provider Signature ___________________________________________________      These reports are needed for  compliance    Please fax this completed form and a copy of the Diabetic Foot Exam report to our office as soon as possible to [phone] ced [place] Na: Phone [phone]    We thank you for your assistance in treating our mutual patient

## 2020-01-23 LAB
LEFT EYE DIABETIC RETINOPATHY: NORMAL
RIGHT EYE DIABETIC RETINOPATHY: NORMAL

## 2020-01-29 DIAGNOSIS — E78.00 PURE HYPERCHOLESTEROLEMIA: ICD-10-CM

## 2020-01-29 RX ORDER — SIMVASTATIN 20 MG
TABLET ORAL
Qty: 90 TABLET | Refills: 0 | Status: SHIPPED | OUTPATIENT
Start: 2020-01-29 | End: 2020-06-01 | Stop reason: SDUPTHER

## 2020-02-03 ENCOUNTER — OFFICE VISIT (OUTPATIENT)
Dept: INTERNAL MEDICINE CLINIC | Facility: CLINIC | Age: 70
End: 2020-02-03
Payer: COMMERCIAL

## 2020-02-03 ENCOUNTER — OFFICE VISIT (OUTPATIENT)
Dept: PULMONOLOGY | Facility: CLINIC | Age: 70
End: 2020-02-03
Payer: COMMERCIAL

## 2020-02-03 VITALS
HEIGHT: 67 IN | HEART RATE: 67 BPM | BODY MASS INDEX: 39.55 KG/M2 | SYSTOLIC BLOOD PRESSURE: 148 MMHG | DIASTOLIC BLOOD PRESSURE: 82 MMHG | WEIGHT: 252 LBS | OXYGEN SATURATION: 94 %

## 2020-02-03 VITALS
SYSTOLIC BLOOD PRESSURE: 148 MMHG | WEIGHT: 252 LBS | HEART RATE: 65 BPM | BODY MASS INDEX: 39.55 KG/M2 | OXYGEN SATURATION: 96 % | HEIGHT: 67 IN | DIASTOLIC BLOOD PRESSURE: 82 MMHG

## 2020-02-03 DIAGNOSIS — J41.0 SIMPLE CHRONIC BRONCHITIS (HCC): ICD-10-CM

## 2020-02-03 DIAGNOSIS — G47.33 OSA (OBSTRUCTIVE SLEEP APNEA): Primary | ICD-10-CM

## 2020-02-03 DIAGNOSIS — I10 ESSENTIAL HYPERTENSION: Primary | ICD-10-CM

## 2020-02-03 PROCEDURE — 99214 OFFICE O/P EST MOD 30 MIN: CPT | Performed by: PHYSICIAN ASSISTANT

## 2020-02-03 PROCEDURE — 3077F SYST BP >= 140 MM HG: CPT | Performed by: INTERNAL MEDICINE

## 2020-02-03 PROCEDURE — 99214 OFFICE O/P EST MOD 30 MIN: CPT | Performed by: INTERNAL MEDICINE

## 2020-02-03 PROCEDURE — 4040F PNEUMOC VAC/ADMIN/RCVD: CPT | Performed by: INTERNAL MEDICINE

## 2020-02-03 PROCEDURE — 4040F PNEUMOC VAC/ADMIN/RCVD: CPT | Performed by: PHYSICIAN ASSISTANT

## 2020-02-03 PROCEDURE — 3079F DIAST BP 80-89 MM HG: CPT | Performed by: INTERNAL MEDICINE

## 2020-02-03 PROCEDURE — 1036F TOBACCO NON-USER: CPT | Performed by: INTERNAL MEDICINE

## 2020-02-03 PROCEDURE — 2022F DILAT RTA XM EVC RTNOPTHY: CPT | Performed by: PHYSICIAN ASSISTANT

## 2020-02-03 PROCEDURE — 3008F BODY MASS INDEX DOCD: CPT | Performed by: INTERNAL MEDICINE

## 2020-02-03 PROCEDURE — 1160F RVW MEDS BY RX/DR IN RCRD: CPT | Performed by: INTERNAL MEDICINE

## 2020-02-03 PROCEDURE — 1036F TOBACCO NON-USER: CPT | Performed by: PHYSICIAN ASSISTANT

## 2020-02-03 PROCEDURE — 3079F DIAST BP 80-89 MM HG: CPT | Performed by: PHYSICIAN ASSISTANT

## 2020-02-03 PROCEDURE — 3008F BODY MASS INDEX DOCD: CPT | Performed by: PHYSICIAN ASSISTANT

## 2020-02-03 PROCEDURE — 3077F SYST BP >= 140 MM HG: CPT | Performed by: PHYSICIAN ASSISTANT

## 2020-02-03 PROCEDURE — 2022F DILAT RTA XM EVC RTNOPTHY: CPT | Performed by: INTERNAL MEDICINE

## 2020-02-03 PROCEDURE — 4010F ACE/ARB THERAPY RXD/TAKEN: CPT | Performed by: INTERNAL MEDICINE

## 2020-02-03 PROCEDURE — 1160F RVW MEDS BY RX/DR IN RCRD: CPT | Performed by: PHYSICIAN ASSISTANT

## 2020-02-03 RX ORDER — AMLODIPINE BESYLATE 5 MG/1
5 TABLET ORAL DAILY
Qty: 60 TABLET | Refills: 5 | Status: SHIPPED | OUTPATIENT
Start: 2020-02-03 | End: 2020-08-27 | Stop reason: ALTCHOICE

## 2020-02-03 RX ORDER — ALBUTEROL SULFATE 90 UG/1
2 AEROSOL, METERED RESPIRATORY (INHALATION) EVERY 6 HOURS PRN
Qty: 1 INHALER | Refills: 3 | Status: SHIPPED | OUTPATIENT
Start: 2020-02-03

## 2020-02-03 NOTE — PROGRESS NOTES
Assessment/Plan:  Regarding her blood pressure she probably should be a little lower than she is  Will add amlodipine 5 mg per day to the regimen  She is going to continue to lose weight a and will plan on seeing her in 6-8 weeks  Once again she will bring a list of blood pressures as well as her blood pressure cuff  1  Essential hypertension  amLODIPine (NORVASC) 5 mg tablet       No orders of the defined types were placed in this encounter  Subjective:  Hypertension     Patient ID: Jannette Yin is a 71 y o  female  HPI when she was in last time her blood pressure was elevated  I asked her to check in with her cuff she brought in a list mostly in the 130s on the high side hand with 1 or 2 in the 140s  Her blood pressure cuff however is reading a little higher than mine  When she 1st got here blood pressure was 148/82  On recheck was 138/76  The following portions of the patient's history were reviewed and updated as appropriate:   She has a past medical history of Allergic rhinitis, Asthma, Carpal tunnel syndrome, Deviated septum, Disc degeneration, lumbar, Generalized osteoarthritis of hand, Hepatitis, History of closed fracture, History of malignant neoplasm of thyroid, Hypertension, Hypothyroidism, Nontoxic goiter, Obesity, HEIDI (obstructive sleep apnea), and Thyroid cancer ([facility] [address] )  ,  does not have any pertinent problems on file  ,   has a past surgical history that includes Popliteal synovial cyst excision; Thyroid surgery; Tonsillectomy; and Knee surgery  ,  family history includes Alcohol abuse in her father; Arrhythmia in her mother; Cirrhosis in her family and father; Heart attack in her mother; Heart disease in her brother; Hypertension in her brother  ,   reports that she has never smoked  She has never used smokeless tobacco  She reports that she drinks alcohol  She reports that she does not use drugs  ,  is allergic to grapefruit extract; lisinopril; loop diuretics; and pamabrom       Current Outpatient Medications:     Blood Glucose Monitoring Suppl (ONE TOUCH ULTRA 2) w/Device KIT, Patient to test two times daily, Disp: 1 each, Rfl: 0    escitalopram (LEXAPRO) 20 mg tablet, TAKE 1 TABLET BY MOUTH EVERY DAY, Disp: 90 tablet, Rfl: 1    fluticasone (FLONASE) 50 mcg/act nasal spray, 1 spray into each nostril 2 (two) times a day, Disp: 1 Bottle, Rfl: 3    glucose blood (ONE TOUCH ULTRA TEST) test strip, Patient to test two times daily, Disp: 200 each, Rfl: 3    ibuprofen (MOTRIN) 400 mg tablet, Take 400 mg by mouth daily as needed  , Disp: , Rfl:     Lancets (ONETOUCH ULTRASOFT) lancets, Patient to test two times daily, Disp: 200 each, Rfl: 3    levothyroxine 150 mcg tablet, TAKE 1 TABLET DAILY  (Patient taking differently: TAKE 1 TABLET DAILY  MON/FRI), Disp: 90 tablet, Rfl: 1    metFORMIN (GLUCOPHAGE) 500 mg tablet, Take 500 mg by mouth daily, Disp: , Rfl:     metoprolol succinate (TOPROL-XL) 50 mg 24 hr tablet, TAKE 1 TABLET DAILY  , Disp: 90 tablet, Rfl: 3    pantoprazole (PROTONIX) 40 mg tablet, TAKE 1 TABLET EVERY DAY, Disp: 90 tablet, Rfl: 1    simvastatin (ZOCOR) 20 mg tablet, TAKE 1 TABLET EVERY DAY, Disp: 90 tablet, Rfl: 0    valsartan (DIOVAN) 320 MG tablet, TAKE 1 TABLET EVERY DAY, Disp: 90 tablet, Rfl: 1    albuterol (PROAIR HFA) 90 mcg/act inhaler, Inhale 2 puffs every 6 (six) hours as needed for wheezing, Disp: 1 Inhaler, Rfl: 3    amLODIPine (NORVASC) 5 mg tablet, Take 1 tablet (5 mg total) by mouth daily, Disp: 60 tablet, Rfl: 5    Review of Systems   Constitutional: Negative for activity change, appetite change, chills, diaphoresis, fatigue, fever and unexpected weight change  She is still overweight but has lost a few lb  She is trying to eat more healthy  HENT: Negative for congestion, ear pain, hearing loss, mouth sores, nosebleeds, postnasal drip, sinus pressure, sinus pain, sore throat and trouble swallowing      Eyes: Negative for pain, discharge and visual disturbance  Respiratory: Negative for apnea, cough, chest tightness, shortness of breath and wheezing  Cardiovascular: Negative for chest pain, palpitations and leg swelling  Gastrointestinal: Negative for abdominal pain, anal bleeding, blood in stool, constipation, diarrhea, nausea and vomiting  She is markedly overweight  Endocrine: Negative for polydipsia and polyphagia  Genitourinary: Negative for decreased urine volume, dysuria, flank pain, frequency, hematuria and urgency  Musculoskeletal: Negative for arthralgias, back pain, gait problem, joint swelling and myalgias  Skin: Negative for rash and wound  Allergic/Immunologic: Negative for environmental allergies and food allergies  Neurological: Negative for dizziness, tremors, seizures, syncope, speech difficulty, light-headedness, numbness and headaches  Hematological: Negative for adenopathy  Does not bruise/bleed easily  Psychiatric/Behavioral: Negative for agitation, confusion, hallucinations, sleep disturbance and suicidal ideas  The patient is not nervous/anxious  Objective:  /82 (BP Location: Left arm, Patient Position: Sitting, Cuff Size: Standard)   Pulse 65   Ht 5' 7" (1 702 m)   Wt 114 kg (252 lb)   SpO2 96%   BMI 39 47 kg/m²      Physical Exam      No results found for this or any previous visit (from the past 1008 hour(s))

## 2020-02-03 NOTE — PROGRESS NOTES
Assessment/Plan:   Diagnoses and all orders for this visit:    HEIDI (obstructive sleep apnea)  -     Split Study; Future    Simple chronic bronchitis (HCC)  -     albuterol (PROAIR HFA) 90 mcg/act inhaler; Inhale 2 puffs every 6 (six) hours as needed for wheezing    Patient is here today for follow up  She has been doing well with her breathing, has not used her Dulera in over 1 year, with minimal need for her rescue inhaler  She is using her CPAP every night but has noted increased night time awakenings, apneic episodes, increased daytime sleepiness  She is due for a new machine as well  She has not had a sleep study done in several years and has not yet had one on Medicare  Will order her a split night study, and will order a new CPAP with the new pressure  She would like to have the sleep study done at [facility] as her daughter is a polysomnographer there  She will follow up with us in 3 months or sooner if necessary  Return in about 3 months (around 5/3/2020)  All questions are answered to the patient's satisfaction and understanding  She verbalizes understanding  She is encouraged to call with any further questions or concerns  Portions of the record may have been created with voice recognition software  Occasional wrong word or "sound a like" substitutions may have occurred due to the inherent limitations of voice recognition software  Read the chart carefully and recognize, using context, where substitutions have occurred      Electronically Signed by Gela Gandhi PA-C    ______________________________________________________________________    Chief Complaint:   Chief Complaint   Patient presents with    Follow-up       Patient ID: Jaya Schofield is a 71 y o  y o  female has a past medical history of Allergic rhinitis, Asthma, Carpal tunnel syndrome, Deviated septum, Disc degeneration, lumbar, Generalized osteoarthritis of hand, Hepatitis, History of closed fracture, History of malignant neoplasm of thyroid, Hypertension, Hypothyroidism, Nontoxic goiter, Obesity, HEIDI (obstructive sleep apnea), and Thyroid cancer ([facility] [address] )  2/3/2020  Patient presents today for follow-up visit  Patient is a 72 yo female nonsmoker with PMH of mild intermittent asthma, HEIDI on CPAP, HTN, DM, GERD  She is here today for follow up  She is overall doing well with her breathing  She has not used her Dulera in over 1 year, has not had much need for her rescue inhaler  She currently has mild cold symptoms without any need for her rescue inhaler  She has been using her CPAP every night but has been waking more through the night, sometimes gasping for air  She feels more tired during the day  She also needs a new CPAP  Review of Systems   Constitutional: Negative  HENT: Negative  Respiratory: Negative  Cardiovascular: Negative  Gastrointestinal: Negative  Genitourinary: Negative  Musculoskeletal: Negative  Skin: Negative  Allergic/Immunologic: Negative  Neurological: Negative  Psychiatric/Behavioral: Negative  Smoking history: She reports that she has never smoked   She has never used smokeless tobacco     The following portions of the patient's history were reviewed and updated as appropriate: allergies, current medications, past family history, past medical history, past social history, past surgical history and problem list     Immunization History   Administered Date(s) Administered    INFLUENZA 12/06/2006, 10/30/2007, 10/29/2008, 10/21/2009, 10/18/2010, 01/05/2019, 10/15/2019    Influenza Split High Dose Preservative Free IM 10/29/2015, 01/04/2019, 10/15/2019    Influenza TIV (IM) 11/11/2003, 12/21/2005, 01/09/2017    Pneumococcal Polysaccharide PPV23 11/11/2003, 12/01/2010, 12/01/2010    Tdap 1950     Current Outpatient Medications   Medication Sig Dispense Refill    albuterol (PROAIR HFA) 90 mcg/act inhaler Inhale 2 puffs every 6 (six) hours as needed for wheezing 1 Inhaler 3  amLODIPine (NORVASC) 5 mg tablet Take 1 tablet (5 mg total) by mouth daily 60 tablet 5    Blood Glucose Monitoring Suppl (ONE TOUCH ULTRA 2) w/Device KIT Patient to test two times daily 1 each 0    escitalopram (LEXAPRO) 20 mg tablet TAKE 1 TABLET BY MOUTH EVERY DAY 90 tablet 1    fluticasone (FLONASE) 50 mcg/act nasal spray 1 spray into each nostril 2 (two) times a day 1 Bottle 3    glucose blood (ONE TOUCH ULTRA TEST) test strip Patient to test two times daily 200 each 3    ibuprofen (MOTRIN) 400 mg tablet Take 400 mg by mouth daily as needed        Lancets (ONETOUCH ULTRASOFT) lancets Patient to test two times daily 200 each 3    levothyroxine 150 mcg tablet TAKE 1 TABLET DAILY  (Patient taking differently: TAKE 1 TABLET DAILY  MON/FRI) 90 tablet 1    metFORMIN (GLUCOPHAGE) 500 mg tablet Take 500 mg by mouth daily      metoprolol succinate (TOPROL-XL) 50 mg 24 hr tablet TAKE 1 TABLET DAILY  90 tablet 3    pantoprazole (PROTONIX) 40 mg tablet TAKE 1 TABLET EVERY DAY 90 tablet 1    simvastatin (ZOCOR) 20 mg tablet TAKE 1 TABLET EVERY DAY 90 tablet 0    valsartan (DIOVAN) 320 MG tablet TAKE 1 TABLET EVERY DAY 90 tablet 1     No current facility-administered medications for this visit  Allergies: Grapefruit extract; Lisinopril; Loop diuretics; and Pamabrom    Objective:  Vitals:    02/03/20 1036   BP: 148/82   Pulse: 67   SpO2: 94%   Weight: 114 kg (252 lb)   Height: 5' 7" (1 702 m)   Oxygen Therapy  SpO2: 94 %    Wt Readings from Last 3 Encounters:   02/03/20 114 kg (252 lb)   02/03/20 114 kg (252 lb)   01/03/20 116 kg (256 lb)     Body mass index is 39 47 kg/m²  Physical Exam   Constitutional: She is oriented to person, place, and time  She appears well-developed and well-nourished  No distress  HENT:   Mouth/Throat: Oropharynx is clear and moist    Crowded oropharyngeal airway   Eyes: Pupils are equal, round, and reactive to light     Cardiovascular: Normal rate, regular rhythm and normal heart sounds  No murmur heard  Pulmonary/Chest: Effort normal and breath sounds normal  No accessory muscle usage  No respiratory distress  She has no decreased breath sounds  She has no wheezes  She has no rhonchi  She has no rales  Abdominal: Soft  There is no tenderness  Musculoskeletal: Normal range of motion  Neurological: She is alert and oriented to person, place, and time  Skin: Skin is warm and dry  No rash noted  Psychiatric: She has a normal mood and affect  Lab Review:   Lab Results   Component Value Date     10/29/2015    K 4 4 05/22/2019    K 4 1 10/29/2015     05/22/2019     10/29/2015    CO2 29 05/22/2019    CO2 26 10/29/2015    BUN 16 05/22/2019    BUN 13 10/29/2015    CREATININE 0 82 05/22/2019    CREATININE 0 70 10/29/2015    GLUCOSE 100 10/29/2015    CALCIUM 9 1 05/22/2019    CALCIUM 8 9 10/29/2015     Lab Results   Component Value Date    WBC 6 99 05/22/2019    WBC 6 28 10/29/2015    HGB 13 6 05/22/2019    HGB 12 7 10/29/2015    HCT 42 4 05/22/2019    HCT 39 0 10/29/2015    MCV 86 05/22/2019    MCV 85 10/29/2015     05/22/2019     10/29/2015       Diagnostics:    none pertinent  Office Spirometry Results:     ESS:    No results found

## 2020-02-05 ENCOUNTER — TELEPHONE (OUTPATIENT)
Dept: INTERNAL MEDICINE CLINIC | Facility: CLINIC | Age: 70
End: 2020-02-05

## 2020-02-05 NOTE — TELEPHONE ENCOUNTER
Was told to monitor BP, monitor told her that she has an irregular heartbeat-she would like to know if she should be concerned?     Call back # [phone]

## 2020-02-06 ENCOUNTER — OFFICE VISIT (OUTPATIENT)
Dept: INTERNAL MEDICINE CLINIC | Facility: CLINIC | Age: 70
End: 2020-02-06
Payer: COMMERCIAL

## 2020-02-06 VITALS
OXYGEN SATURATION: 95 % | BODY MASS INDEX: 39.62 KG/M2 | WEIGHT: 252.4 LBS | DIASTOLIC BLOOD PRESSURE: 78 MMHG | HEIGHT: 67 IN | HEART RATE: 67 BPM | SYSTOLIC BLOOD PRESSURE: 142 MMHG

## 2020-02-06 DIAGNOSIS — I49.9 IRREGULAR HEARTBEAT: ICD-10-CM

## 2020-02-06 DIAGNOSIS — R07.9 CHEST PAIN, UNSPECIFIED TYPE: Primary | ICD-10-CM

## 2020-02-06 PROCEDURE — 1036F TOBACCO NON-USER: CPT | Performed by: INTERNAL MEDICINE

## 2020-02-06 PROCEDURE — 3008F BODY MASS INDEX DOCD: CPT | Performed by: INTERNAL MEDICINE

## 2020-02-06 PROCEDURE — 1160F RVW MEDS BY RX/DR IN RCRD: CPT | Performed by: INTERNAL MEDICINE

## 2020-02-06 PROCEDURE — 3077F SYST BP >= 140 MM HG: CPT | Performed by: INTERNAL MEDICINE

## 2020-02-06 PROCEDURE — 93000 ELECTROCARDIOGRAM COMPLETE: CPT | Performed by: INTERNAL MEDICINE

## 2020-02-06 PROCEDURE — 99215 OFFICE O/P EST HI 40 MIN: CPT | Performed by: INTERNAL MEDICINE

## 2020-02-06 PROCEDURE — 4040F PNEUMOC VAC/ADMIN/RCVD: CPT | Performed by: INTERNAL MEDICINE

## 2020-02-06 PROCEDURE — 2022F DILAT RTA XM EVC RTNOPTHY: CPT | Performed by: INTERNAL MEDICINE

## 2020-02-06 PROCEDURE — 3078F DIAST BP <80 MM HG: CPT | Performed by: INTERNAL MEDICINE

## 2020-02-06 NOTE — TELEPHONE ENCOUNTER
PT  CALLED BACK AND DOES NOT KNOW HOW TO CHECK HER PULSE MANUALLY, WAS GIVEN DIRECTIONS AND STATED HAS SOME PINCHING OVER RIGHT BREAST , SPOKE TO XIMENA AND PT WAS OFFERED [id number]  APPT  W/DR REEVES OR [facility] AP[facility]   AND WILL BE SEEN AT 11:15

## 2020-02-06 NOTE — PROGRESS NOTES
Assessment/Plan: This is a woman with multiple risk factors including hypertension hyperlipidemia obesity and diabetes  She had atypical chest pain but no cardiogram changes  EKG shows possible old septal scar  And will proceed with chest x-ray stress test     Because of the possible arrhythmia she will have a Holter monitor  She will return in 2 weeks  If she has any prolonged chest discomfort she is to go to the emergency room  Approximately 35 minute spent with patient  1  Chest pain, unspecified type  POCT ECG    XR chest pa & lateral    NM myocardial perfusion spect (rx stress and/or rest)    Holter monitor - 24 hour   2  Irregular heartbeat  Holter monitor - 24 hour       Orders Placed This Encounter   Procedures    XR chest pa & lateral    NM myocardial perfusion spect (rx stress and/or rest)    Holter monitor - 24 hour    POCT ECG            Subjective:  Chest pain and irregular heartbeat     Patient ID: Riley Osei is a 71 y o  female  HPI this is a patient with multiple risk factors including hypertension hyperlipidemia and diabetes  Last night she had an episode of retrosternal chest pain which radiated through to the back on the right side  The duration was approximately 60 seconds  She took her blood pressure a in her blood pressure she recorded in irregular heartbeat  She did not feel her pulse  The chest discomfort lasted about a minute  She is obese has diabetes hypertension hyperlipidemia  She has not had a recent stress test done  Today she has no chest discomfort      The following portions of the patient's history were reviewed and updated as appropriate:   She has a past medical history of Allergic rhinitis, Asthma, Carpal tunnel syndrome, Deviated septum, Disc degeneration, lumbar, Generalized osteoarthritis of hand, Hepatitis, History of closed fracture, History of malignant neoplasm of thyroid, Hypertension, Hypothyroidism, Nontoxic goiter, Obesity, HEIDI (obstructive sleep apnea), and Thyroid cancer ([facility] [address] )  ,  does not have any pertinent problems on file  ,   has a past surgical history that includes Popliteal synovial cyst excision; Thyroid surgery; Tonsillectomy; and Knee surgery  ,  family history includes Alcohol abuse in her father; Arrhythmia in her mother; Cirrhosis in her family and father; Heart attack in her mother; Heart disease in her brother; Hypertension in her brother  ,   reports that she has never smoked  She has never used smokeless tobacco  She reports that she drinks alcohol  She reports that she does not use drugs  ,  is allergic to grapefruit extract; lisinopril; loop diuretics; and pamabrom       Current Outpatient Medications:     albuterol (PROAIR HFA) 90 mcg/act inhaler, Inhale 2 puffs every 6 (six) hours as needed for wheezing, Disp: 1 Inhaler, Rfl: 3    amLODIPine (NORVASC) 5 mg tablet, Take 1 tablet (5 mg total) by mouth daily, Disp: 60 tablet, Rfl: 5    Blood Glucose Monitoring Suppl (ONE TOUCH ULTRA 2) w/Device KIT, Patient to test two times daily, Disp: 1 each, Rfl: 0    escitalopram (LEXAPRO) 20 mg tablet, TAKE 1 TABLET BY MOUTH EVERY DAY, Disp: 90 tablet, Rfl: 1    fluticasone (FLONASE) 50 mcg/act nasal spray, 1 spray into each nostril 2 (two) times a day, Disp: 1 Bottle, Rfl: 3    glucose blood (ONE TOUCH ULTRA TEST) test strip, Patient to test two times daily, Disp: 200 each, Rfl: 3    ibuprofen (MOTRIN) 400 mg tablet, Take 400 mg by mouth daily as needed  , Disp: , Rfl:     Lancets (ONETOUCH ULTRASOFT) lancets, Patient to test two times daily, Disp: 200 each, Rfl: 3    levothyroxine 150 mcg tablet, TAKE 1 TABLET DAILY  (Patient taking differently: TAKE 1 TABLET DAILY  MON/FRI), Disp: 90 tablet, Rfl: 1    metFORMIN (GLUCOPHAGE) 500 mg tablet, Take 500 mg by mouth daily, Disp: , Rfl:     metoprolol succinate (TOPROL-XL) 50 mg 24 hr tablet, TAKE 1 TABLET DAILY  , Disp: 90 tablet, Rfl: 3    pantoprazole (PROTONIX) 40 mg tablet, TAKE 1 TABLET EVERY DAY, Disp: 90 tablet, Rfl: 1    simvastatin (ZOCOR) 20 mg tablet, TAKE 1 TABLET EVERY DAY, Disp: 90 tablet, Rfl: 0    valsartan (DIOVAN) 320 MG tablet, TAKE 1 TABLET EVERY DAY, Disp: 90 tablet, Rfl: 1    Review of Systems   Constitutional: Negative for activity change, appetite change, chills, diaphoresis, fatigue, fever and unexpected weight change  HENT: Negative for congestion, ear pain, hearing loss, mouth sores, nosebleeds, postnasal drip, sinus pressure, sinus pain, sore throat and trouble swallowing  Eyes: Negative for pain, discharge and visual disturbance  Respiratory: Negative for apnea, cough, chest tightness, shortness of breath and wheezing  Cardiovascular: Positive for chest pain  Negative for palpitations and leg swelling  She did not notice any palpitations but her blood pressure cuff recorded irregular heartbeat  She has no history of atrial fibrillation  Gastrointestinal: Negative for abdominal pain, anal bleeding, blood in stool, constipation, diarrhea, nausea and vomiting  Endocrine: Negative for polydipsia and polyphagia  Genitourinary: Negative for decreased urine volume, dysuria, flank pain, frequency, hematuria and urgency  Musculoskeletal: Negative for arthralgias, back pain, gait problem, joint swelling and myalgias  Skin: Negative for rash and wound  Allergic/Immunologic: Negative for environmental allergies and food allergies  Neurological: Negative for dizziness, tremors, seizures, syncope, speech difficulty, light-headedness, numbness and headaches  Hematological: Negative for adenopathy  Does not bruise/bleed easily  Psychiatric/Behavioral: Negative for agitation, confusion, hallucinations, sleep disturbance and suicidal ideas  The patient is not nervous/anxious            Objective:  /78 (BP Location: Left arm, Cuff Size: Standard)   Pulse 67   Ht 5' 7" (1 702 m)   Wt 114 kg (252 lb 6 4 oz)   SpO2 95%   BMI 39 53 kg/m²      Physical Exam   Constitutional: She appears well-developed  No distress  She is morbidly obese  Blood pressure today is 134/70  Rhythm is regular at the moment  Rate is 72  Respirations are 20  O2 saturations 95%  HENT:   Head: Normocephalic  Right Ear: External ear normal    Left Ear: External ear normal    Nose: Nose normal    Mouth/Throat: Oropharynx is clear and moist  No oropharyngeal exudate  Eyes: Pupils are equal, round, and reactive to light  Conjunctivae and EOM are normal  Right eye exhibits no discharge  Left eye exhibits no discharge  Neck: Normal range of motion  Neck supple  No thyromegaly present  Cardiovascular: Normal rate, regular rhythm, normal heart sounds and intact distal pulses  Exam reveals no gallop and no friction rub  No murmur heard  Heart tones are somewhat distant  Rhythm is regular at the moment  Pulmonary/Chest: Effort normal and breath sounds normal  No respiratory distress  She has no wheezes  She has no rales  Abdominal: Soft  Bowel sounds are normal  She exhibits no distension and no mass  There is no tenderness  There is no rebound and no guarding  Musculoskeletal: Normal range of motion  She exhibits no edema, tenderness or deformity  Lymphadenopathy:     She has no cervical adenopathy  Neurological: She is alert  She has normal reflexes  She displays normal reflexes  No cranial nerve deficit  Coordination normal    Skin: Skin is warm and dry  No rash noted  No erythema  Psychiatric: She has a normal mood and affect  Her behavior is normal  Judgment and thought content normal    Nursing note and vitals reviewed  No results found for this or any previous visit (from the past 1008 hour(s))

## 2020-03-04 NOTE — TELEPHONE ENCOUNTER
Pt showed for appt at 10:00 am
Pt was a no show for today appt  With Dr Rafa Titus  Pt was called and a message was left on Thursday 6/27/19 to confirm appt and asked pt to call back to inform us of either cancelling or keeping  7/1/19  Called pt cell and home numbers reaching out regarding missing appt   LVM
- - -

## 2020-03-09 ENCOUNTER — HOSPITAL ENCOUNTER (OUTPATIENT)
Dept: NON INVASIVE DIAGNOSTICS | Facility: CLINIC | Age: 70
Discharge: HOME | End: 2020-03-09
Payer: COMMERCIAL

## 2020-03-09 DIAGNOSIS — K21.9 GASTROESOPHAGEAL REFLUX DISEASE WITHOUT ESOPHAGITIS: ICD-10-CM

## 2020-03-09 DIAGNOSIS — R07.9 CHEST PAIN, UNSPECIFIED TYPE: ICD-10-CM

## 2020-03-09 DIAGNOSIS — I49.9 IRREGULAR HEARTBEAT: ICD-10-CM

## 2020-03-09 PROCEDURE — 93226 XTRNL ECG REC<48 HR SCAN A/R: CPT

## 2020-03-09 PROCEDURE — 93225 XTRNL ECG REC<48 HRS REC: CPT

## 2020-03-09 RX ORDER — PANTOPRAZOLE SODIUM 40 MG/1
TABLET, DELAYED RELEASE ORAL
Qty: 90 TABLET | Refills: 1 | Status: SHIPPED | OUTPATIENT
Start: 2020-03-09 | End: 2020-08-19

## 2020-03-11 PROCEDURE — 93227 XTRNL ECG REC<48 HR R&I: CPT | Performed by: INTERNAL MEDICINE

## 2020-03-19 ENCOUNTER — TELEPHONE (OUTPATIENT)
Dept: INTERNAL MEDICINE CLINIC | Facility: CLINIC | Age: 70
End: 2020-03-19

## 2020-03-19 NOTE — TELEPHONE ENCOUNTER
NM Myocardial was approved  Auth#: [id number]  3/19 to 9/15/2020  Site: [facility]; [address] through [employer] is odell for 3/27; next Fri   Maximo Campo and Deloris Grimaldo in testing were notified of the Keyur Tuckers had sent me an email that an Darol Cleverly is needed

## 2020-04-05 DIAGNOSIS — F32.A DEPRESSION, UNSPECIFIED DEPRESSION TYPE: ICD-10-CM

## 2020-04-05 DIAGNOSIS — I10 ESSENTIAL HYPERTENSION: ICD-10-CM

## 2020-04-06 PROCEDURE — 4010F ACE/ARB THERAPY RXD/TAKEN: CPT | Performed by: INTERNAL MEDICINE

## 2020-04-06 RX ORDER — VALSARTAN 320 MG/1
TABLET ORAL
Qty: 90 TABLET | Refills: 1 | Status: SHIPPED | OUTPATIENT
Start: 2020-04-06

## 2020-04-06 RX ORDER — ESCITALOPRAM OXALATE 20 MG/1
TABLET ORAL
Qty: 90 TABLET | Refills: 1 | Status: SHIPPED | OUTPATIENT
Start: 2020-04-06

## 2020-04-13 DIAGNOSIS — I10 ESSENTIAL HYPERTENSION: ICD-10-CM

## 2020-04-13 RX ORDER — METOPROLOL SUCCINATE 50 MG/1
TABLET, EXTENDED RELEASE ORAL
Qty: 90 TABLET | Refills: 3 | Status: SHIPPED | OUTPATIENT
Start: 2020-04-13

## 2020-05-19 ENCOUNTER — HOSPITAL ENCOUNTER (OUTPATIENT)
Dept: NON INVASIVE DIAGNOSTICS | Facility: CLINIC | Age: 70
Discharge: HOME | End: 2020-05-19
Payer: COMMERCIAL

## 2020-05-19 DIAGNOSIS — R07.9 CHEST PAIN, UNSPECIFIED TYPE: ICD-10-CM

## 2020-05-19 PROCEDURE — 93016 CV STRESS TEST SUPVJ ONLY: CPT | Performed by: INTERNAL MEDICINE

## 2020-05-19 PROCEDURE — 93017 CV STRESS TEST TRACING ONLY: CPT

## 2020-05-19 PROCEDURE — A9502 TC99M TETROFOSMIN: HCPCS

## 2020-05-19 PROCEDURE — 93018 CV STRESS TEST I&R ONLY: CPT | Performed by: INTERNAL MEDICINE

## 2020-05-19 PROCEDURE — 78452 HT MUSCLE IMAGE SPECT MULT: CPT

## 2020-05-19 PROCEDURE — 78452 HT MUSCLE IMAGE SPECT MULT: CPT | Performed by: INTERNAL MEDICINE

## 2020-05-21 LAB
MAX DIASTOLIC BP: 76 MMHG
MAX HEART RATE: 76 BPM
MAX PREDICTED HEART RATE: 151 BPM
MAX. SYSTOLIC BP: 126 MMHG
PROTOCOL NAME: NORMAL
REASON FOR TERMINATION: NORMAL
TARGET HR FORMULA: NORMAL
TEST INDICATION: NORMAL
TIME IN EXERCISE PHASE: NORMAL

## 2020-05-26 ENCOUNTER — TELEPHONE (OUTPATIENT)
Dept: INTERNAL MEDICINE CLINIC | Facility: CLINIC | Age: 70
End: 2020-05-26

## 2020-05-26 RX ORDER — DIPHENOXYLATE HYDROCHLORIDE AND ATROPINE SULFATE 2.5; .025 MG/1; MG/1
1 TABLET ORAL DAILY
COMMUNITY

## 2020-05-27 ENCOUNTER — TELEMEDICINE (OUTPATIENT)
Dept: INTERNAL MEDICINE CLINIC | Facility: CLINIC | Age: 70
End: 2020-05-27
Payer: COMMERCIAL

## 2020-05-27 DIAGNOSIS — E66.9 OBESITY WITHOUT SERIOUS COMORBIDITY, UNSPECIFIED CLASSIFICATION, UNSPECIFIED OBESITY TYPE: ICD-10-CM

## 2020-05-27 DIAGNOSIS — Z79.4 TYPE 2 DIABETES MELLITUS WITH HYPERGLYCEMIA, WITH LONG-TERM CURRENT USE OF INSULIN (HCC): ICD-10-CM

## 2020-05-27 DIAGNOSIS — E11.9 TYPE 2 DIABETES MELLITUS WITHOUT COMPLICATION, WITHOUT LONG-TERM CURRENT USE OF INSULIN (HCC): Primary | ICD-10-CM

## 2020-05-27 DIAGNOSIS — E78.5 HYPERLIPIDEMIA, UNSPECIFIED HYPERLIPIDEMIA TYPE: ICD-10-CM

## 2020-05-27 DIAGNOSIS — E11.65 TYPE 2 DIABETES MELLITUS WITH HYPERGLYCEMIA, WITH LONG-TERM CURRENT USE OF INSULIN (HCC): ICD-10-CM

## 2020-05-27 PROCEDURE — 99442 PR PHYS/QHP TELEPHONE EVALUATION 11-20 MIN: CPT | Performed by: INTERNAL MEDICINE

## 2020-05-28 ENCOUNTER — TELEPHONE (OUTPATIENT)
Dept: INTERNAL MEDICINE CLINIC | Facility: CLINIC | Age: 70
End: 2020-05-28

## 2020-05-30 DIAGNOSIS — E78.00 PURE HYPERCHOLESTEROLEMIA: ICD-10-CM

## 2020-06-01 DIAGNOSIS — E78.00 PURE HYPERCHOLESTEROLEMIA: ICD-10-CM

## 2020-06-01 RX ORDER — SIMVASTATIN 20 MG
20 TABLET ORAL DAILY
Qty: 90 TABLET | Refills: 0 | Status: SHIPPED | OUTPATIENT
Start: 2020-06-01 | End: 2020-08-19

## 2020-06-02 ENCOUNTER — TELEPHONE (OUTPATIENT)
Dept: PULMONOLOGY | Facility: CLINIC | Age: 70
End: 2020-06-02

## 2020-06-02 DIAGNOSIS — J41.0 SIMPLE CHRONIC BRONCHITIS (HCC): Primary | ICD-10-CM

## 2020-06-04 RX ORDER — SIMVASTATIN 20 MG
TABLET ORAL
Qty: 90 TABLET | Refills: 0 | Status: SHIPPED | OUTPATIENT
Start: 2020-06-04

## 2020-06-10 ENCOUNTER — TELEPHONE (OUTPATIENT)
Dept: PULMONOLOGY | Facility: CLINIC | Age: 70
End: 2020-06-10

## 2020-06-10 DIAGNOSIS — J41.0 SIMPLE CHRONIC BRONCHITIS (HCC): Primary | ICD-10-CM

## 2020-06-10 RX ORDER — BUDESONIDE AND FORMOTEROL FUMARATE DIHYDRATE 160; 4.5 UG/1; UG/1
2 AEROSOL RESPIRATORY (INHALATION) 2 TIMES DAILY
Qty: 1 INHALER | Refills: 3 | Status: SHIPPED | OUTPATIENT
Start: 2020-06-10 | End: 2020-09-08

## 2020-07-14 ENCOUNTER — TELEPHONE (OUTPATIENT)
Dept: INTERNAL MEDICINE CLINIC | Facility: CLINIC | Age: 70
End: 2020-07-14
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
## 2020-08-07 DIAGNOSIS — E03.9 HYPOTHYROIDISM, UNSPECIFIED TYPE: ICD-10-CM

## 2020-08-07 RX ORDER — LEVOTHYROXINE SODIUM 0.15 MG/1
TABLET ORAL
Qty: 90 TABLET | Refills: 1 | Status: SHIPPED | OUTPATIENT
Start: 2020-08-07

## 2020-08-08 DIAGNOSIS — E11.9 TYPE 2 DIABETES MELLITUS WITHOUT COMPLICATION, WITHOUT LONG-TERM CURRENT USE OF INSULIN (HCC): ICD-10-CM

## 2020-08-10 RX ORDER — BLOOD SUGAR DIAGNOSTIC
STRIP MISCELLANEOUS
Qty: 200 EACH | Refills: 3 | Status: SHIPPED | OUTPATIENT
Start: 2020-08-10

## 2020-08-18 DIAGNOSIS — K21.9 GASTROESOPHAGEAL REFLUX DISEASE WITHOUT ESOPHAGITIS: ICD-10-CM

## 2020-08-18 DIAGNOSIS — E78.00 PURE HYPERCHOLESTEROLEMIA: ICD-10-CM

## 2020-08-19 RX ORDER — PANTOPRAZOLE SODIUM 40 MG/1
TABLET, DELAYED RELEASE ORAL
Qty: 90 TABLET | Refills: 1 | Status: SHIPPED | OUTPATIENT
Start: 2020-08-19

## 2020-08-19 RX ORDER — SIMVASTATIN 20 MG
TABLET ORAL
Qty: 90 TABLET | Refills: 0 | Status: SHIPPED | OUTPATIENT
Start: 2020-08-19 | End: 2020-08-27 | Stop reason: CLARIF

## 2020-08-24 ENCOUNTER — TELEPHONE (OUTPATIENT)
Dept: INTERNAL MEDICINE CLINIC | Facility: CLINIC | Age: 70
End: 2020-08-24

## 2020-08-24 NOTE — TELEPHONE ENCOUNTER
Radiology is going to notify a Orthopedics about this problem    Do not know why it was sent to me at all
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
## 2020-08-27 ENCOUNTER — TELEPHONE (OUTPATIENT)
Dept: ADMINISTRATIVE | Facility: OTHER | Age: 70
End: 2020-08-27

## 2020-08-27 ENCOUNTER — OFFICE VISIT (OUTPATIENT)
Dept: INTERNAL MEDICINE CLINIC | Facility: CLINIC | Age: 70
End: 2020-08-27
Payer: COMMERCIAL

## 2020-08-27 VITALS
OXYGEN SATURATION: 96 % | HEART RATE: 83 BPM | BODY MASS INDEX: 39.53 KG/M2 | SYSTOLIC BLOOD PRESSURE: 122 MMHG | HEIGHT: 67 IN | TEMPERATURE: 97.8 F | DIASTOLIC BLOOD PRESSURE: 60 MMHG

## 2020-08-27 DIAGNOSIS — E78.5 HYPERLIPIDEMIA, UNSPECIFIED HYPERLIPIDEMIA TYPE: ICD-10-CM
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
## 2020-08-27 PROCEDURE — 2022F DILAT RTA XM EVC RTNOPTHY: CPT | Performed by: INTERNAL MEDICINE

## 2020-08-27 PROCEDURE — 3074F SYST BP LT 130 MM HG: CPT | Performed by: INTERNAL MEDICINE

## 2020-08-27 PROCEDURE — 3078F DIAST BP <80 MM HG: CPT | Performed by: INTERNAL MEDICINE

## 2020-08-27 PROCEDURE — 1160F RVW MEDS BY RX/DR IN RCRD: CPT | Performed by: INTERNAL MEDICINE

## 2020-08-27 PROCEDURE — 1036F TOBACCO NON-USER: CPT | Performed by: INTERNAL MEDICINE

## 2020-08-27 PROCEDURE — 4040F PNEUMOC VAC/ADMIN/RCVD: CPT | Performed by: INTERNAL MEDICINE

## 2020-08-27 PROCEDURE — 99215 OFFICE O/P EST HI 40 MIN: CPT | Performed by: INTERNAL MEDICINE

## 2020-08-27 RX ORDER — TRAMADOL HYDROCHLORIDE 50 MG/1
25 TABLET ORAL
COMMUNITY
Start: 2020-08-18 | End: 2021-08-18

## 2020-08-27 RX ORDER — LORAZEPAM 1 MG/1
1 TABLET ORAL
COMMUNITY

## 2020-08-27 RX ORDER — ACETAMINOPHEN 500 MG
500 TABLET ORAL EVERY 8 HOURS
COMMUNITY
Start: 2020-08-18

## 2020-08-27 RX ORDER — FERROUS SULFATE 325(65) MG
325 TABLET ORAL 2 TIMES DAILY
COMMUNITY
Start: 2020-08-18 | End: 2021-08-18

## 2020-08-27 RX ORDER — CEPHALEXIN 500 MG/1
500 CAPSULE ORAL 4 TIMES DAILY
COMMUNITY
Start: 2020-08-24 | End: 2020-08-29

## 2020-08-27 RX ORDER — ASCORBIC ACID 250 MG
250 TABLET ORAL
COMMUNITY
Start: 2020-08-18

## 2020-08-27 RX ORDER — CHOLECALCIFEROL (VITAMIN D3) 125 MCG
5 CAPSULE ORAL
COMMUNITY
Start: 2020-08-18

## 2020-08-27 NOTE — TELEPHONE ENCOUNTER
----- Message from Loki Landers sent at 8/27/2020 11:42 AM EDT -----  Regarding: eye exam [facility] internal med  08/27/20 11:43 AM    Hello, our patient Yosvany Mendez has had Diabetic Eye Exam completed/performed  Please assist in updating the patient chart by making an External outreach to [facility] [phone] facility located in [place]   The date of service is 2020    Thank you,  Shawnee Hong MA  [facility]  [address]

## 2020-08-27 NOTE — LETTER
Diabetic Eye Exam Form    Date Requested: 20  Patient: Alesia Heredia  Patient : 1950   Referring Provider: Maryam Pablo,     Dilated Retinal Exam, Optomap-Iris Exam, or Fundus Photography Done         Yes ([place] one above)         No     Date of Diabetic Eye Exam ______________________________  Left Eye      Exam did show retinopathy    Exam did not show retinopathy         Mild       Moderate       None       Proliferative       Severe     Right Eye     Exam did show retinopathy    Exam did not show retinopathy         Mild       Moderate       None       Proliferative       Severe     Comments __________________________________________________________    Practice Providing Exam ______________________________________________    Exam Performed By (print name) _______________________________________      Provider Signature ___________________________________________________      These reports are needed for  compliance    Please fax this completed form and a copy of the Diabetic Eye Exam report to our office located at [place] [address] as soon as possible to [phone] attention [place]: Phone [phone]    We thank you for your assistance in treating our mutual patient

## 2020-08-27 NOTE — LETTER
Diabetic Eye Exam Form    Date Requested: 20  Patient: Peterson León  Patient : 1950   Referring Provider: Ehsan Lopez,     Dilated Retinal Exam, Optomap-Iris Exam, or Fundus Photography Done         Yes ([place] one above)         No     Date of Diabetic Eye Exam ______________________________  Left Eye      Exam did show retinopathy    Exam did not show retinopathy         Mild       Moderate       None       Proliferative       Severe     Right Eye     Exam did show retinopathy    Exam did not show retinopathy         Mild       Moderate       None       Proliferative       Severe     Comments __________________________________________________________    Practice Providing Exam ______________________________________________    Exam Performed By (print name) _______________________________________      Provider Signature ___________________________________________________      These reports are needed for  compliance    Please fax this completed form and a copy of the Diabetic Eye Exam report to our office located at [place] [address] as soon as possible to [phone] attention [place]: Phone [phone]    We thank you for your assistance in treating our mutual patient
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
## 2020-08-27 NOTE — TELEPHONE ENCOUNTER
Upon review of the In Basket request and the patient's chart, initial outreach has been made via fax, please see Contacts section for details  A second outreach attempt will be made within 5 business days      Thank you  Bernabe Bob

## 2020-09-02 NOTE — TELEPHONE ENCOUNTER
As a follow-up, a second attempt has been made for outreach via fax, please see Contacts section for details  A third and final attempt will be made within 5 business days      Thank you  June Banuelos

## 2020-09-02 NOTE — TELEPHONE ENCOUNTER
Upon review of the In Basket request we were able to locate, review, and update the patient chart as requested for Diabetic Eye Exam     Any additional questions or concerns should be emailed to the Practice Liaisons via [email or web address] email, please do not reply via In Basket      Thank you  Roscoe Oswald

## 2020-09-04 ENCOUNTER — TELEPHONE (OUTPATIENT)
Dept: INTERNAL MEDICINE CLINIC | Facility: CLINIC | Age: 70
End: 2020-09-04

## 2020-09-08 DIAGNOSIS — J41.0 SIMPLE CHRONIC BRONCHITIS (HCC): ICD-10-CM

## 2020-09-08 RX ORDER — BUDESONIDE AND FORMOTEROL FUMARATE DIHYDRATE 160; 4.5 UG/1; UG/1
AEROSOL RESPIRATORY (INHALATION)
Qty: 30.6 INHALER | Refills: 1 | Status: SHIPPED | OUTPATIENT
Start: 2020-09-08

## 2020-09-08 NOTE — TELEPHONE ENCOUNTER
Can you please confirm if patient is using this inhaler?  Last note says she was on dulera but wasn't really needing it

## 2020-09-16 ENCOUNTER — OFFICE VISIT (OUTPATIENT)
Dept: NEUROLOGY | Facility: CLINIC | Age: 70
End: 2020-09-16
Payer: COMMERCIAL

## 2020-09-16 VITALS — DIASTOLIC BLOOD PRESSURE: 82 MMHG | SYSTOLIC BLOOD PRESSURE: 138 MMHG | HEART RATE: 66 BPM

## 2020-09-16 DIAGNOSIS — R42 DIZZINESS AND GIDDINESS: Primary | ICD-10-CM

## 2020-09-16 PROCEDURE — 3079F DIAST BP 80-89 MM HG: CPT | Performed by: PSYCHIATRY & NEUROLOGY

## 2020-09-16 PROCEDURE — 3075F SYST BP GE 130 - 139MM HG: CPT | Performed by: PSYCHIATRY & NEUROLOGY

## 2020-09-16 PROCEDURE — 99214 OFFICE O/P EST MOD 30 MIN: CPT | Performed by: PSYCHIATRY & NEUROLOGY

## 2020-09-16 RX ORDER — BACITRACIN/POLYMYXIN B SULFATE 500-10K/G
OINTMENT (GRAM) TOPICAL
COMMUNITY
Start: 2020-08-18

## 2020-09-16 RX ORDER — POLYETHYLENE GLYCOL 3350 17 G/17G
POWDER, FOR SOLUTION ORAL
COMMUNITY
Start: 2020-08-18
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
## 2020-09-22 ENCOUNTER — TELEPHONE (OUTPATIENT)
Dept: INTERNAL MEDICINE CLINIC | Facility: CLINIC | Age: 70
End: 2020-09-22

## 2020-09-22 NOTE — TELEPHONE ENCOUNTER
Stephenie from [facility] called to report a fall  When she went to visit the pt today she was on the floor  She was trying to transfer from her commode to her wheelchair but fell  Had to call EMT to help her up  No injuries or bruising   VISHAL

## 2020-09-28 ENCOUNTER — OFFICE VISIT (OUTPATIENT)
Dept: INTERNAL MEDICINE CLINIC | Facility: CLINIC | Age: 70
End: 2020-09-28
Payer: COMMERCIAL

## 2020-09-28 VITALS
DIASTOLIC BLOOD PRESSURE: 74 MMHG | OXYGEN SATURATION: 96 % | BODY MASS INDEX: 39.53 KG/M2 | SYSTOLIC BLOOD PRESSURE: 132 MMHG | HEIGHT: 67 IN | HEART RATE: 66 BPM | TEMPERATURE: 98 F

## 2020-09-28 DIAGNOSIS — J45.909 MODERATE ASTHMA WITHOUT COMPLICATION, UNSPECIFIED WHETHER PERSISTENT: ICD-10-CM

## 2020-09-28 DIAGNOSIS — I10 ESSENTIAL HYPERTENSION: ICD-10-CM

## 2020-09-28 DIAGNOSIS — F32.A DEPRESSION, UNSPECIFIED DEPRESSION TYPE: ICD-10-CM

## 2020-09-28 DIAGNOSIS — E11.9 TYPE 2 DIABETES MELLITUS WITHOUT COMPLICATION, WITHOUT LONG-TERM CURRENT USE OF INSULIN (HCC): ICD-10-CM

## 2020-09-28 DIAGNOSIS — E66.9 OBESITY WITHOUT SERIOUS COMORBIDITY, UNSPECIFIED CLASSIFICATION, UNSPECIFIED OBESITY TYPE: ICD-10-CM

## 2020-09-28 DIAGNOSIS — Z23 ENCOUNTER FOR IMMUNIZATION: Primary | ICD-10-CM
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
## 2020-09-28 PROCEDURE — G0008 ADMIN INFLUENZA VIRUS VAC: HCPCS | Performed by: INTERNAL MEDICINE

## 2020-09-28 PROCEDURE — 1160F RVW MEDS BY RX/DR IN RCRD: CPT | Performed by: INTERNAL MEDICINE

## 2020-09-28 PROCEDURE — 90662 IIV NO PRSV INCREASED AG IM: CPT | Performed by: INTERNAL MEDICINE

## 2020-09-28 PROCEDURE — 1036F TOBACCO NON-USER: CPT | Performed by: INTERNAL MEDICINE

## 2020-09-28 PROCEDURE — 99214 OFFICE O/P EST MOD 30 MIN: CPT | Performed by: INTERNAL MEDICINE
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
## 2020-11-27 DIAGNOSIS — E11.9 TYPE 2 DIABETES MELLITUS WITHOUT COMPLICATION, WITHOUT LONG-TERM CURRENT USE OF INSULIN (HCC): ICD-10-CM

## 2020-12-03 RX ORDER — LANCETS 33 GAUGE
EACH MISCELLANEOUS
Qty: 200 EACH | Refills: 3 | Status: SHIPPED | OUTPATIENT
Start: 2020-12-03

## 2021-03-09 DIAGNOSIS — Z23 ENCOUNTER FOR IMMUNIZATION: ICD-10-CM

## 2022-04-14 ENCOUNTER — TELEPHONE (OUTPATIENT)
Dept: GASTROENTEROLOGY | Facility: CLINIC | Age: 72
End: 2022-04-14